# Patient Record
Sex: FEMALE | Race: WHITE | NOT HISPANIC OR LATINO
[De-identification: names, ages, dates, MRNs, and addresses within clinical notes are randomized per-mention and may not be internally consistent; named-entity substitution may affect disease eponyms.]

---

## 2017-07-16 PROBLEM — Z00.00 ENCOUNTER FOR PREVENTIVE HEALTH EXAMINATION: Status: ACTIVE | Noted: 2017-07-16

## 2017-08-14 ENCOUNTER — APPOINTMENT (OUTPATIENT)
Dept: SURGERY | Facility: CLINIC | Age: 52
End: 2017-08-14

## 2017-10-09 ENCOUNTER — APPOINTMENT (OUTPATIENT)
Dept: SURGERY | Facility: CLINIC | Age: 52
End: 2017-10-09

## 2018-03-19 ENCOUNTER — APPOINTMENT (OUTPATIENT)
Dept: SURGERY | Facility: CLINIC | Age: 53
End: 2018-03-19

## 2018-06-11 ENCOUNTER — APPOINTMENT (OUTPATIENT)
Dept: SURGERY | Facility: CLINIC | Age: 53
End: 2018-06-11

## 2019-01-30 ENCOUNTER — APPOINTMENT (OUTPATIENT)
Dept: SURGERY | Facility: CLINIC | Age: 54
End: 2019-01-30
Payer: COMMERCIAL

## 2019-01-30 VITALS
HEIGHT: 65 IN | DIASTOLIC BLOOD PRESSURE: 90 MMHG | HEART RATE: 77 BPM | TEMPERATURE: 97.6 F | WEIGHT: 270.6 LBS | SYSTOLIC BLOOD PRESSURE: 157 MMHG | BODY MASS INDEX: 45.08 KG/M2

## 2019-01-30 DIAGNOSIS — Z87.891 PERSONAL HISTORY OF NICOTINE DEPENDENCE: ICD-10-CM

## 2019-01-30 DIAGNOSIS — Z01.818 ENCOUNTER FOR OTHER PREPROCEDURAL EXAMINATION: ICD-10-CM

## 2019-01-30 DIAGNOSIS — Z78.9 OTHER SPECIFIED HEALTH STATUS: ICD-10-CM

## 2019-01-30 PROCEDURE — 99203 OFFICE O/P NEW LOW 30 MIN: CPT

## 2019-01-30 NOTE — ASSESSMENT
[FreeTextEntry1] : Ms. Vaca is a 54 year old female with morbid obesity and BMI 44 which places her in the morbidly obese category. This has directly contributed to her current medical conditions as well as, a decreased quality of life. She has very little chance of successfully losing and maintaining a significant amount of weight with non-surgical management, and would benefit from surgical intervention. She meets the criteria for weight loss surgery as defined in the NIH consensus statement, surgery is medically necessary. \par Given patient’s current BMI and obesity-related comorbidities, I feel the patient is a candidate for and would benefit from weight loss surgery, as all prior attempts by non-surgical means have been futile. \par Patient remains an excellent candidate for weight loss surgery, given her multiple obesity-related comorbidities, and is committed to proceeding with weight loss surgery. \par Treatment Plans \par I have had a lengthy conversation with the patient and have discussed my impressions and treatment plans with the patient. Informed consent and potential risks , benefits  of the planned surgery were discussed in depth, including but not limited to leak, sepsis, death, reflux, inability to lose desired amount of weight or regain of weight, need for other procedure or re-operation, among others. All surgical options were discussed including non-surgical treatments. The patient is interested in a sleeve gastrectomy for weight loss. \par The weight loss surgery education packet as well as the nutrition education packet have been reviewed and given to the patient, and I also provided patient with detailed information regarding the pre-operative requirements with respect to testing, medical, nutritional and psychological evaluations and documentation of same. \par I also discussed was importance of taking supplements and attending regular follow-up. I reviewed importance of behavioral modification and follow-up in order to optimize outcomes and avoid complications. The patient is aware of the expected length of stay and discharge plan for the sleeve gastrectomy procedure. I encouraged the patient to maintain a diet and exercise program to promote optimum health for the surgical procedure and post-op course. \par testing and evaluations and documentation received, this information to justify medical necessity for weight loss surgery will be submitted to insurance for pre-certification. \par The patient had the opportunity to ask pertinent questions, and all of patient’s questions and concerns were addressed to patient’s satisfaction, and patient verbalized an understanding of the information discussed. \par

## 2019-01-30 NOTE — PHYSICAL EXAM
[Obese] : obese [Obese, well nourished, in no acute distress] : obese, well nourished, in no acute distress [Normal] : affect appropriate

## 2019-01-30 NOTE — REVIEW OF SYSTEMS
[Wheezing] : no wheezing [Cough] : no cough [SOB on Exertion] : shortness of breath during exertion [Abdominal Pain] : no abdominal pain [Reflux/Heartburn] : no reflux/ heartburn [Negative] : Allergic/Immunologic

## 2019-01-30 NOTE — HISTORY OF PRESENT ILLNESS
[de-identified] : Ms. Vaca is a 54 year old female who presents for bariatric surgery consultation. Has a long-standing history of severe obesity refractory to multiple prior attempts at weight loss including conservative treatments of diet and exercise. Patient has also developed increased risk or worsening obesity-related co-morbidities, including sleep apnea. \par She has completed all of her required pre-operative evaluations and is ready to proceed with a sleeve gastrectomy.  \par She reports no interval changes to her medical history or overall health status.

## 2019-01-30 NOTE — PLAN
[FreeTextEntry1] : We will submit all completed testing and evaluations for insurance pre-certification for a sleeve gastrectomy.  She will also meet with our bariatric coordinator prior to surgery.  \par She will complete PST once surgery date has been scheduled. \par

## 2019-03-06 ENCOUNTER — APPOINTMENT (OUTPATIENT)
Dept: SURGERY | Facility: CLINIC | Age: 54
End: 2019-03-06
Payer: COMMERCIAL

## 2019-03-06 VITALS
HEART RATE: 89 BPM | HEIGHT: 65 IN | TEMPERATURE: 97.7 F | SYSTOLIC BLOOD PRESSURE: 124 MMHG | WEIGHT: 267 LBS | DIASTOLIC BLOOD PRESSURE: 84 MMHG | BODY MASS INDEX: 44.48 KG/M2

## 2019-03-06 DIAGNOSIS — Z82.49 FAMILY HISTORY OF ISCHEMIC HEART DISEASE AND OTHER DISEASES OF THE CIRCULATORY SYSTEM: ICD-10-CM

## 2019-03-06 PROCEDURE — 99214 OFFICE O/P EST MOD 30 MIN: CPT

## 2019-03-06 NOTE — CONSULT LETTER
[Dear  ___] : Dear  [unfilled], [Consult Letter:] : I had the pleasure of evaluating your patient, [unfilled]. [Please see my note below.] : Please see my note below. [Consult Closing:] : Thank you very much for allowing me to participate in the care of this patient.  If you have any questions, please do not hesitate to contact me. [FreeTextEntry3] : Martin

## 2019-03-06 NOTE — PHYSICAL EXAM
[Alert] : alert [Oriented to Person] : oriented to person [Oriented to Place] : oriented to place [Oriented to Time] : oriented to time [Calm] : calm [Respiratory Effort] : normal respiratory effort [de-identified] : The patient is alert, well-groomed, and cheerful. [de-identified] : NC/AT, PERRL, EOMI, sclera anicteric [de-identified] :  Neck supple. Trachea midline.   [de-identified] : Obese, protuberant. Soft, nontender, nondistended, no rebound or guarding.\par  [de-identified] : Ambulating without difficulty [de-identified] : No Jaundice

## 2019-03-06 NOTE — PLAN
[FreeTextEntry1] : I have had a lengthy and detailed conversation with the patient and have discussed my impressions and treatment plans with the patient. \par Informed consent and potential risks , benefits  and alternatives to the planned surgery were discussed in depth, including but not limited to:  bleeding, infection,  Leak; esophageal, gastric, hepatic or intestinal injury; reflux, inability to lose desired amount of weight or regain of weight, need for other procedure or re-operation, sepsis, death, among others. All surgical options were discussed including non-surgical treatments. The patient remains interested in a sleeve gastrectomy for weight loss. \par The weight loss surgery education packet as well as the nutrition education packet have been reviewed and given to the patient previously, and I also provided patient with detailed information regarding the post-operative instructions and expectations.  \par Also discussed was importance of taking supplements and attending regular follow-up. I reviewed importance of behavioral modification and follow-up in order to optimize outcomes and avoid complications. \par The patient is aware of the expected length of stay and discharge plan for the sleeve gastrectomy procedure. I encouraged the patient to maintain a diet and exercise program to promote optimum health for the surgical procedure and post-op course. \par I informed patient that all testing and evaluations and documentation have been received and reviewed.  This information to justify medical necessity for weight loss surgery will be submitted to insurance for pre-certification. \par \par We will plan to proceed with surgery on 3/18/19, pending any required insurance pre-certification or pre-approval.  \par Patient agrees to obtain any outstanding necessary pre-operative evaluations and clearances that may be required for pre-surgical optimization.  \par \par The patient had the opportunity to ask pertinent questions, and all of patient’s questions and concerns were addressed to patient’s satisfaction.  She verbalized an understanding of the information discussed, and wishes to proceed with surgery. Informed consent for laparoscopic sleeve gastrectomy reviewed with and signed by patient.\par

## 2019-03-06 NOTE — ASSESSMENT
[FreeTextEntry1] : Ms. Vaca is a 54 year old female who presents for bariatric surgery follow up. patient offers no complaints. patient reports no fever, chills,  or  pain. she reports occasional SOB on exertion. she states that she has difficulty using the CPAP machine but she tries her best.  she saw Dr Ortez (PMD)  and he will give her  clearance for surgery. patient is scheduled for sleeve gastrectomy on 03/18/2019.   Complications including dehydration, nausea/vomiting, gastroesophageal reflux, perforation or leak, bowel obstruction, bleeding, venous thromboembolism, vitamin deficiency, and need for reoperation were discussed. All the patient's questions were answered and the patient would like to proceed with surgical planning.  \par \par Given patient’s current BMI and obesity-related comorbidities, I feel the patient remains a candidate for and would benefit from weight loss surgery, as all prior attempts by non-surgical means have been futile, and given  {his/her (lowercase):98161 } multiple obesity-related comorbidities.  Patient has put a great deal of thought and consideration into weight loss surgery, and remains  committed to proceeding with a sleeve gastrectomy. \par

## 2019-03-06 NOTE — HISTORY OF PRESENT ILLNESS
[de-identified] : Ms. Vaca is a 54 year old female who presents for bariatric surgery follow up. patient offers no complaints. patient reports no fever, chills,  or  pain. she reports occasional SOB on exertion. Not in acute distress right now.  she states that she has difficulty using the CPAP machine but she tries her best.  she saw Dr Ortez (PMD)  and he will give her  clearance for surgery. patient is scheduled for  sleeve gastrectomy  on 03/18/2019.  [de-identified] : Ms. Vaca presents for review and discussion of informed consent in preparation for planned sleeve gastrectomy on 3/18/19 . \par \par She had previously attended our weight loss seminar, and has completed all of the required pre-operative testing and evaluations, and is ready to proceed with a sleeve gastrectomy. \par \par She is aware of the pre-op liquid diet for 2 weeks and will complete pre-testing and follow with a visit to PCP for medical clearance. \par \par She has a long-standing history of severe obesity refractory to multiple prior attempts at weight loss including conservative treatments of diet and exercise. In addition, She suffers from obesity-related co-morbidities.  \par \par Patient reports no interval changes to her overall health status or medical history, and has no complaints at this time. \par

## 2019-03-06 NOTE — REVIEW OF SYSTEMS
[Feeling Tired] : feeling tired [SOB on Exertion] : shortness of breath during exertion [Fever] : no fever [Chills] : no chills [Feeling Poorly] : not feeling poorly [Sore Throat] : no sore throat [Hoarseness] : no hoarseness [Chest Pain] : no chest pain [Palpitations] : no palpitations [Shortness Of Breath] : no shortness of breath [Wheezing] : no wheezing [Cough] : no cough [Abdominal Pain] : no abdominal pain [Vomiting] : no vomiting [Diarrhea] : no diarrhea [Heartburn] : no heartburn [Dysuria] : no dysuria [Arthralgias] : no arthralgias [Skin Lesions] : no skin lesions [Easy Bleeding] : no tendency for easy bleeding [Easy Bruising] : no tendency for easy bruising [Negative] : Gastrointestinal

## 2019-03-11 ENCOUNTER — OUTPATIENT (OUTPATIENT)
Dept: OUTPATIENT SERVICES | Facility: HOSPITAL | Age: 54
LOS: 1 days | End: 2019-03-11
Payer: COMMERCIAL

## 2019-03-11 VITALS
WEIGHT: 268.08 LBS | SYSTOLIC BLOOD PRESSURE: 124 MMHG | OXYGEN SATURATION: 96 % | HEIGHT: 65 IN | RESPIRATION RATE: 18 BRPM | DIASTOLIC BLOOD PRESSURE: 83 MMHG | TEMPERATURE: 97 F | HEART RATE: 80 BPM

## 2019-03-11 DIAGNOSIS — J45.909 UNSPECIFIED ASTHMA, UNCOMPLICATED: ICD-10-CM

## 2019-03-11 DIAGNOSIS — Z98.51 TUBAL LIGATION STATUS: Chronic | ICD-10-CM

## 2019-03-11 DIAGNOSIS — E66.01 MORBID (SEVERE) OBESITY DUE TO EXCESS CALORIES: ICD-10-CM

## 2019-03-11 DIAGNOSIS — G47.33 OBSTRUCTIVE SLEEP APNEA (ADULT) (PEDIATRIC): ICD-10-CM

## 2019-03-11 DIAGNOSIS — Z01.818 ENCOUNTER FOR OTHER PREPROCEDURAL EXAMINATION: ICD-10-CM

## 2019-03-11 DIAGNOSIS — Z98.890 OTHER SPECIFIED POSTPROCEDURAL STATES: Chronic | ICD-10-CM

## 2019-03-11 LAB
ALBUMIN SERPL ELPH-MCNC: 3.6 G/DL — SIGNIFICANT CHANGE UP (ref 3.5–5)
ALP SERPL-CCNC: 99 U/L — SIGNIFICANT CHANGE UP (ref 40–120)
ALT FLD-CCNC: 63 U/L DA — HIGH (ref 10–60)
ANION GAP SERPL CALC-SCNC: 6 MMOL/L — SIGNIFICANT CHANGE UP (ref 5–17)
APTT BLD: 35.6 SEC — SIGNIFICANT CHANGE UP (ref 27.5–36.3)
AST SERPL-CCNC: 32 U/L — SIGNIFICANT CHANGE UP (ref 10–40)
BILIRUB SERPL-MCNC: 0.5 MG/DL — SIGNIFICANT CHANGE UP (ref 0.2–1.2)
BUN SERPL-MCNC: 13 MG/DL — SIGNIFICANT CHANGE UP (ref 7–18)
CALCIUM SERPL-MCNC: 8.9 MG/DL — SIGNIFICANT CHANGE UP (ref 8.4–10.5)
CHLORIDE SERPL-SCNC: 106 MMOL/L — SIGNIFICANT CHANGE UP (ref 96–108)
CO2 SERPL-SCNC: 26 MMOL/L — SIGNIFICANT CHANGE UP (ref 22–31)
CREAT SERPL-MCNC: 0.76 MG/DL — SIGNIFICANT CHANGE UP (ref 0.5–1.3)
GLUCOSE SERPL-MCNC: 101 MG/DL — HIGH (ref 70–99)
HBA1C BLD-MCNC: 6 % — HIGH (ref 4–5.6)
HCT VFR BLD CALC: 46.6 % — HIGH (ref 34.5–45)
HGB BLD-MCNC: 15 G/DL — SIGNIFICANT CHANGE UP (ref 11.5–15.5)
INR BLD: 1.27 RATIO — HIGH (ref 0.88–1.16)
MCHC RBC-ENTMCNC: 26.9 PG — LOW (ref 27–34)
MCHC RBC-ENTMCNC: 32.2 GM/DL — SIGNIFICANT CHANGE UP (ref 32–36)
MCV RBC AUTO: 83.5 FL — SIGNIFICANT CHANGE UP (ref 80–100)
NRBC # BLD: 0 /100 WBCS — SIGNIFICANT CHANGE UP (ref 0–0)
PLATELET # BLD AUTO: 279 K/UL — SIGNIFICANT CHANGE UP (ref 150–400)
POTASSIUM SERPL-MCNC: 4 MMOL/L — SIGNIFICANT CHANGE UP (ref 3.5–5.3)
POTASSIUM SERPL-SCNC: 4 MMOL/L — SIGNIFICANT CHANGE UP (ref 3.5–5.3)
PROT SERPL-MCNC: 8.3 G/DL — SIGNIFICANT CHANGE UP (ref 6–8.3)
PROTHROM AB SERPL-ACNC: 14.2 SEC — HIGH (ref 10–12.9)
RBC # BLD: 5.58 M/UL — HIGH (ref 3.8–5.2)
RBC # FLD: 12.9 % — SIGNIFICANT CHANGE UP (ref 10.3–14.5)
SODIUM SERPL-SCNC: 138 MMOL/L — SIGNIFICANT CHANGE UP (ref 135–145)
WBC # BLD: 8.8 K/UL — SIGNIFICANT CHANGE UP (ref 3.8–10.5)
WBC # FLD AUTO: 8.8 K/UL — SIGNIFICANT CHANGE UP (ref 3.8–10.5)

## 2019-03-11 PROCEDURE — 85027 COMPLETE CBC AUTOMATED: CPT

## 2019-03-11 PROCEDURE — 83036 HEMOGLOBIN GLYCOSYLATED A1C: CPT

## 2019-03-11 PROCEDURE — G0463: CPT

## 2019-03-11 PROCEDURE — 85730 THROMBOPLASTIN TIME PARTIAL: CPT

## 2019-03-11 PROCEDURE — 86901 BLOOD TYPING SEROLOGIC RH(D): CPT

## 2019-03-11 PROCEDURE — 85610 PROTHROMBIN TIME: CPT

## 2019-03-11 PROCEDURE — 86850 RBC ANTIBODY SCREEN: CPT

## 2019-03-11 PROCEDURE — 36415 COLL VENOUS BLD VENIPUNCTURE: CPT

## 2019-03-11 PROCEDURE — 80053 COMPREHEN METABOLIC PANEL: CPT

## 2019-03-11 PROCEDURE — 86900 BLOOD TYPING SEROLOGIC ABO: CPT

## 2019-03-11 RX ORDER — SODIUM CHLORIDE 9 MG/ML
3 INJECTION INTRAMUSCULAR; INTRAVENOUS; SUBCUTANEOUS EVERY 8 HOURS
Qty: 0 | Refills: 0 | Status: DISCONTINUED | OUTPATIENT
Start: 2019-03-18 | End: 2019-03-20

## 2019-03-11 NOTE — H&P PST ADULT - PROBLEM SELECTOR PLAN 2
Periop and postop precautions for moderate DAVIS. Patient agrees to bring CPAP machine the day of surgery

## 2019-03-11 NOTE — H&P PST ADULT - GASTROINTESTINAL DETAILS
no masses palpable/no organomegaly/no guarding/soft/nontender/no bruit/no rebound tenderness/bowel sounds normal/no rigidity/no distention

## 2019-03-11 NOTE — H&P PST ADULT - FAMILY HISTORY
Father  Still living? No  Family history of acute myocardial infarction, Age at diagnosis: Age Unknown  Family history of heart failure, Age at diagnosis: Age Unknown     Mother  Still living? Yes, Estimated age: Age Unknown  Family history of essential hypertension, Age at diagnosis: Age Unknown

## 2019-03-11 NOTE — H&P PST ADULT - NEGATIVE GASTROINTESTINAL SYMPTOMS
no vomiting/no diarrhea/no change in bowel habits/no flatulence/no abdominal pain/no melena/no constipation/no nausea

## 2019-03-11 NOTE — H&P PST ADULT - PSH
H/O arthroscopy of right knee    History of bilateral tubal ligation    S/P tubal ligation H/O arthroscopy of right knee    S/P tubal ligation

## 2019-03-11 NOTE — H&P PST ADULT - HISTORY OF PRESENT ILLNESS
55 y/o old female with Asthma, uncomplicated, DAVIS on CPAP, right knee osteoarthritis, morbid obesity due to excess calories presents to [facility] for presurgical evaluation. She is scheduled for laparoscopic sleeve gastrectomy 3/18/2019

## 2019-03-11 NOTE — H&P PST ADULT - ACTIVITY
Unable to walk >1 block, climbs 2 FOS w/o SOB, but complains of severe right knee pain and bilateral hip pain

## 2019-03-11 NOTE — H&P PST ADULT - NEGATIVE CARDIOVASCULAR SYMPTOMS
no orthopnea/no peripheral edema/no claudication/no palpitations/no paroxysmal nocturnal dyspnea/no chest pain

## 2019-03-11 NOTE — H&P PST ADULT - RS GEN PE MLT RESP DETAILS PC
no intercostal retractions/no rales/no rhonchi/respirations non-labored/no wheezes/airway patent/no subcutaneous emphysema/good air movement/breath sounds equal/clear to auscultation bilaterally

## 2019-03-11 NOTE — H&P PST ADULT - ATTENDING COMMENTS
Patient seen and examined by me.   Patient reports no interval changes to her overall health status or medical history.   Patient here for a sleeve gastrectomy.   Risks, benefits and alternatives to surgery, including the option of no surgery, were reinforced with the patient.    Patient's questions and concerns addressed to her satisfaction, and patient verbalized an understanding of the information discussed.   Surgical consent signed.

## 2019-03-11 NOTE — H&P PST ADULT - ASSESSMENT
55 y/o old female with Asthma, uncomplicated, moderate DAVIS on CPAP, right knee osteoarthritis, morbid obesity due to excess calories scheduled for laparoscopic sleeve gastrectomy 3/18/2019

## 2019-03-11 NOTE — H&P PST ADULT - NEGATIVE GENERAL SYMPTOMS
no polyphagia/no polyuria/no polydipsia/no fever/no chills/no fatigue/no sweating/no anorexia/no weight gain/no malaise

## 2019-03-11 NOTE — H&P PST ADULT - PROBLEM SELECTOR PLAN 1
Scheduled for laparoscopic sleeve gastrectomy 3/18/2019. Preoperative instructions discussed and given to patient. Verbalized understanding of same

## 2019-03-17 ENCOUNTER — TRANSCRIPTION ENCOUNTER (OUTPATIENT)
Age: 54
End: 2019-03-17

## 2019-03-18 ENCOUNTER — INPATIENT (INPATIENT)
Facility: HOSPITAL | Age: 54
LOS: 1 days | Discharge: HOME | DRG: 621 | End: 2019-03-20
Attending: SURGERY | Admitting: SURGERY
Payer: COMMERCIAL

## 2019-03-18 ENCOUNTER — RESULT REVIEW (OUTPATIENT)
Age: 54
End: 2019-03-18

## 2019-03-18 VITALS
SYSTOLIC BLOOD PRESSURE: 135 MMHG | WEIGHT: 268.08 LBS | OXYGEN SATURATION: 95 % | DIASTOLIC BLOOD PRESSURE: 88 MMHG | HEART RATE: 92 BPM | RESPIRATION RATE: 17 BRPM | HEIGHT: 65 IN | TEMPERATURE: 98 F

## 2019-03-18 DIAGNOSIS — Z98.890 OTHER SPECIFIED POSTPROCEDURAL STATES: Chronic | ICD-10-CM

## 2019-03-18 DIAGNOSIS — Z98.51 TUBAL LIGATION STATUS: Chronic | ICD-10-CM

## 2019-03-18 DIAGNOSIS — E66.01 MORBID (SEVERE) OBESITY DUE TO EXCESS CALORIES: ICD-10-CM

## 2019-03-18 PROCEDURE — 43775 LAP SLEEVE GASTRECTOMY: CPT

## 2019-03-18 PROCEDURE — 43775 LAP SLEEVE GASTRECTOMY: CPT | Mod: AS

## 2019-03-18 RX ORDER — ALBUTEROL 90 UG/1
2 AEROSOL, METERED ORAL
Qty: 0 | Refills: 0 | COMMUNITY

## 2019-03-18 RX ORDER — FENTANYL CITRATE 50 UG/ML
25 INJECTION INTRAVENOUS
Qty: 0 | Refills: 0 | Status: DISCONTINUED | OUTPATIENT
Start: 2019-03-18 | End: 2019-03-18

## 2019-03-18 RX ORDER — SODIUM CHLORIDE 9 MG/ML
1000 INJECTION, SOLUTION INTRAVENOUS
Qty: 0 | Refills: 0 | Status: DISCONTINUED | OUTPATIENT
Start: 2019-03-18 | End: 2019-03-20

## 2019-03-18 RX ORDER — HYDROMORPHONE HYDROCHLORIDE 2 MG/ML
0.5 INJECTION INTRAMUSCULAR; INTRAVENOUS; SUBCUTANEOUS
Qty: 0 | Refills: 0 | Status: DISCONTINUED | OUTPATIENT
Start: 2019-03-18 | End: 2019-03-20

## 2019-03-18 RX ORDER — ACETAMINOPHEN 500 MG
1000 TABLET ORAL ONCE
Qty: 0 | Refills: 0 | Status: COMPLETED | OUTPATIENT
Start: 2019-03-18 | End: 2019-03-18

## 2019-03-18 RX ORDER — PANTOPRAZOLE SODIUM 20 MG/1
40 TABLET, DELAYED RELEASE ORAL EVERY 24 HOURS
Qty: 0 | Refills: 0 | Status: DISCONTINUED | OUTPATIENT
Start: 2019-03-18 | End: 2019-03-20

## 2019-03-18 RX ORDER — ONDANSETRON 8 MG/1
4 TABLET, FILM COATED ORAL EVERY 6 HOURS
Qty: 0 | Refills: 0 | Status: DISCONTINUED | OUTPATIENT
Start: 2019-03-18 | End: 2019-03-20

## 2019-03-18 RX ORDER — HYOSCYAMINE SULFATE 0.13 MG
0.12 TABLET ORAL EVERY 6 HOURS
Qty: 0 | Refills: 0 | Status: DISCONTINUED | OUTPATIENT
Start: 2019-03-18 | End: 2019-03-20

## 2019-03-18 RX ORDER — ENOXAPARIN SODIUM 100 MG/ML
40 INJECTION SUBCUTANEOUS ONCE
Qty: 0 | Refills: 0 | Status: COMPLETED | OUTPATIENT
Start: 2019-03-18 | End: 2019-03-18

## 2019-03-18 RX ORDER — METOCLOPRAMIDE HCL 10 MG
10 TABLET ORAL EVERY 6 HOURS
Qty: 0 | Refills: 0 | Status: DISCONTINUED | OUTPATIENT
Start: 2019-03-18 | End: 2019-03-20

## 2019-03-18 RX ORDER — ENOXAPARIN SODIUM 100 MG/ML
40 INJECTION SUBCUTANEOUS EVERY 12 HOURS
Qty: 0 | Refills: 0 | Status: DISCONTINUED | OUTPATIENT
Start: 2019-03-19 | End: 2019-03-20

## 2019-03-18 RX ORDER — SCOPALAMINE 1 MG/3D
1 PATCH, EXTENDED RELEASE TRANSDERMAL ONCE
Qty: 0 | Refills: 0 | Status: COMPLETED | OUTPATIENT
Start: 2019-03-18 | End: 2019-03-18

## 2019-03-18 RX ORDER — ALBUTEROL 90 UG/1
2 AEROSOL, METERED ORAL EVERY 6 HOURS
Qty: 0 | Refills: 0 | Status: DISCONTINUED | OUTPATIENT
Start: 2019-03-18 | End: 2019-03-20

## 2019-03-18 RX ADMIN — FENTANYL CITRATE 25 MICROGRAM(S): 50 INJECTION INTRAVENOUS at 12:25

## 2019-03-18 RX ADMIN — HYDROMORPHONE HYDROCHLORIDE 0.5 MILLIGRAM(S): 2 INJECTION INTRAMUSCULAR; INTRAVENOUS; SUBCUTANEOUS at 15:00

## 2019-03-18 RX ADMIN — ONDANSETRON 4 MILLIGRAM(S): 8 TABLET, FILM COATED ORAL at 21:34

## 2019-03-18 RX ADMIN — Medication 1000 MILLIGRAM(S): at 20:00

## 2019-03-18 RX ADMIN — HYDROMORPHONE HYDROCHLORIDE 0.5 MILLIGRAM(S): 2 INJECTION INTRAMUSCULAR; INTRAVENOUS; SUBCUTANEOUS at 19:39

## 2019-03-18 RX ADMIN — FENTANYL CITRATE 25 MICROGRAM(S): 50 INJECTION INTRAVENOUS at 11:51

## 2019-03-18 RX ADMIN — HYDROMORPHONE HYDROCHLORIDE 0.5 MILLIGRAM(S): 2 INJECTION INTRAMUSCULAR; INTRAVENOUS; SUBCUTANEOUS at 23:08

## 2019-03-18 RX ADMIN — Medication 400 MILLIGRAM(S): at 14:33

## 2019-03-18 RX ADMIN — Medication 1000 MILLIGRAM(S): at 22:00

## 2019-03-18 RX ADMIN — FENTANYL CITRATE 25 MICROGRAM(S): 50 INJECTION INTRAVENOUS at 11:36

## 2019-03-18 RX ADMIN — SODIUM CHLORIDE 3 MILLILITER(S): 9 INJECTION INTRAMUSCULAR; INTRAVENOUS; SUBCUTANEOUS at 21:30

## 2019-03-18 RX ADMIN — SCOPALAMINE 1 PATCH: 1 PATCH, EXTENDED RELEASE TRANSDERMAL at 21:33

## 2019-03-18 RX ADMIN — PANTOPRAZOLE SODIUM 40 MILLIGRAM(S): 20 TABLET, DELAYED RELEASE ORAL at 14:34

## 2019-03-18 RX ADMIN — ENOXAPARIN SODIUM 40 MILLIGRAM(S): 100 INJECTION SUBCUTANEOUS at 07:23

## 2019-03-18 RX ADMIN — FENTANYL CITRATE 25 MICROGRAM(S): 50 INJECTION INTRAVENOUS at 12:40

## 2019-03-18 RX ADMIN — Medication 400 MILLIGRAM(S): at 21:34

## 2019-03-18 RX ADMIN — FENTANYL CITRATE 25 MICROGRAM(S): 50 INJECTION INTRAVENOUS at 11:06

## 2019-03-18 RX ADMIN — FENTANYL CITRATE 25 MICROGRAM(S): 50 INJECTION INTRAVENOUS at 14:15

## 2019-03-18 RX ADMIN — HYDROMORPHONE HYDROCHLORIDE 0.5 MILLIGRAM(S): 2 INJECTION INTRAMUSCULAR; INTRAVENOUS; SUBCUTANEOUS at 20:01

## 2019-03-18 RX ADMIN — FENTANYL CITRATE 25 MICROGRAM(S): 50 INJECTION INTRAVENOUS at 11:21

## 2019-03-18 RX ADMIN — Medication 10 MILLIGRAM(S): at 17:37

## 2019-03-18 RX ADMIN — ONDANSETRON 4 MILLIGRAM(S): 8 TABLET, FILM COATED ORAL at 15:05

## 2019-03-18 RX ADMIN — HYDROMORPHONE HYDROCHLORIDE 0.5 MILLIGRAM(S): 2 INJECTION INTRAMUSCULAR; INTRAVENOUS; SUBCUTANEOUS at 23:29

## 2019-03-18 RX ADMIN — FENTANYL CITRATE 25 MICROGRAM(S): 50 INJECTION INTRAVENOUS at 13:37

## 2019-03-18 RX ADMIN — HYDROMORPHONE HYDROCHLORIDE 0.5 MILLIGRAM(S): 2 INJECTION INTRAMUSCULAR; INTRAVENOUS; SUBCUTANEOUS at 15:30

## 2019-03-18 RX ADMIN — SODIUM CHLORIDE 160 MILLILITER(S): 9 INJECTION, SOLUTION INTRAVENOUS at 11:49

## 2019-03-18 RX ADMIN — SODIUM CHLORIDE 3 MILLILITER(S): 9 INJECTION INTRAMUSCULAR; INTRAVENOUS; SUBCUTANEOUS at 07:08

## 2019-03-18 NOTE — PROGRESS NOTE ADULT - ASSESSMENT
53 y/o female s/p Sleeve Gastrectomy, Post-op Stable      1. Ice chips  2. DVT PPx  3. AM Labs  4. Out of bed  5. CPAP tonight  6. Incentive Spirometer  7. Pain meds as needed  8. Likely clears in AM

## 2019-03-18 NOTE — BRIEF OPERATIVE NOTE - OPERATION/FINDINGS
No hiatal hernia present.  Longitudinal sleeve gastrectomy formed over 40 Frnch ViSiGi with staple line reinforcement and negative leak test.

## 2019-03-19 ENCOUNTER — TRANSCRIPTION ENCOUNTER (OUTPATIENT)
Age: 54
End: 2019-03-19

## 2019-03-19 LAB
ANION GAP SERPL CALC-SCNC: 4 MMOL/L — LOW (ref 5–17)
BASOPHILS # BLD AUTO: 0.03 K/UL — SIGNIFICANT CHANGE UP (ref 0–0.2)
BASOPHILS NFR BLD AUTO: 0.3 % — SIGNIFICANT CHANGE UP (ref 0–2)
BUN SERPL-MCNC: 12 MG/DL — SIGNIFICANT CHANGE UP (ref 7–18)
CALCIUM SERPL-MCNC: 8 MG/DL — LOW (ref 8.4–10.5)
CHLORIDE SERPL-SCNC: 106 MMOL/L — SIGNIFICANT CHANGE UP (ref 96–108)
CO2 SERPL-SCNC: 29 MMOL/L — SIGNIFICANT CHANGE UP (ref 22–31)
CREAT SERPL-MCNC: 0.74 MG/DL — SIGNIFICANT CHANGE UP (ref 0.5–1.3)
EOSINOPHIL # BLD AUTO: 0.03 K/UL — SIGNIFICANT CHANGE UP (ref 0–0.5)
EOSINOPHIL NFR BLD AUTO: 0.3 % — SIGNIFICANT CHANGE UP (ref 0–6)
GLUCOSE SERPL-MCNC: 100 MG/DL — HIGH (ref 70–99)
HCT VFR BLD CALC: 41.5 % — SIGNIFICANT CHANGE UP (ref 34.5–45)
HGB BLD-MCNC: 13.1 G/DL — SIGNIFICANT CHANGE UP (ref 11.5–15.5)
IMM GRANULOCYTES NFR BLD AUTO: 0.4 % — SIGNIFICANT CHANGE UP (ref 0–1.5)
LYMPHOCYTES # BLD AUTO: 2.26 K/UL — SIGNIFICANT CHANGE UP (ref 1–3.3)
LYMPHOCYTES # BLD AUTO: 22.7 % — SIGNIFICANT CHANGE UP (ref 13–44)
MCHC RBC-ENTMCNC: 27 PG — SIGNIFICANT CHANGE UP (ref 27–34)
MCHC RBC-ENTMCNC: 31.6 GM/DL — LOW (ref 32–36)
MCV RBC AUTO: 85.4 FL — SIGNIFICANT CHANGE UP (ref 80–100)
MONOCYTES # BLD AUTO: 0.78 K/UL — SIGNIFICANT CHANGE UP (ref 0–0.9)
MONOCYTES NFR BLD AUTO: 7.8 % — SIGNIFICANT CHANGE UP (ref 2–14)
NEUTROPHILS # BLD AUTO: 6.81 K/UL — SIGNIFICANT CHANGE UP (ref 1.8–7.4)
NEUTROPHILS NFR BLD AUTO: 68.5 % — SIGNIFICANT CHANGE UP (ref 43–77)
NRBC # BLD: 0 /100 WBCS — SIGNIFICANT CHANGE UP (ref 0–0)
PLATELET # BLD AUTO: 265 K/UL — SIGNIFICANT CHANGE UP (ref 150–400)
POTASSIUM SERPL-MCNC: 4 MMOL/L — SIGNIFICANT CHANGE UP (ref 3.5–5.3)
POTASSIUM SERPL-SCNC: 4 MMOL/L — SIGNIFICANT CHANGE UP (ref 3.5–5.3)
RBC # BLD: 4.86 M/UL — SIGNIFICANT CHANGE UP (ref 3.8–5.2)
RBC # FLD: 13.1 % — SIGNIFICANT CHANGE UP (ref 10.3–14.5)
SODIUM SERPL-SCNC: 139 MMOL/L — SIGNIFICANT CHANGE UP (ref 135–145)
WBC # BLD: 9.95 K/UL — SIGNIFICANT CHANGE UP (ref 3.8–10.5)
WBC # FLD AUTO: 9.95 K/UL — SIGNIFICANT CHANGE UP (ref 3.8–10.5)

## 2019-03-19 RX ORDER — ONDANSETRON 8 MG/1
1 TABLET, FILM COATED ORAL
Qty: 90 | Refills: 0 | OUTPATIENT
Start: 2019-03-19 | End: 2019-04-17

## 2019-03-19 RX ORDER — HYOSCYAMINE SULFATE 0.13 MG
1 TABLET ORAL
Qty: 120 | Refills: 0 | OUTPATIENT
Start: 2019-03-19 | End: 2019-04-17

## 2019-03-19 RX ORDER — OMEPRAZOLE 10 MG/1
1 CAPSULE, DELAYED RELEASE ORAL
Qty: 30 | Refills: 0 | OUTPATIENT
Start: 2019-03-19 | End: 2019-04-17

## 2019-03-19 RX ORDER — MELOXICAM 15 MG/1
1 TABLET ORAL
Qty: 0 | Refills: 0 | COMMUNITY

## 2019-03-19 RX ORDER — KETOROLAC TROMETHAMINE 30 MG/ML
30 SYRINGE (ML) INJECTION ONCE
Qty: 0 | Refills: 0 | Status: DISCONTINUED | OUTPATIENT
Start: 2019-03-19 | End: 2019-03-19

## 2019-03-19 RX ORDER — CYCLOBENZAPRINE HYDROCHLORIDE 10 MG/1
1 TABLET, FILM COATED ORAL
Qty: 0 | Refills: 0 | COMMUNITY

## 2019-03-19 RX ADMIN — SODIUM CHLORIDE 3 MILLILITER(S): 9 INJECTION INTRAMUSCULAR; INTRAVENOUS; SUBCUTANEOUS at 21:12

## 2019-03-19 RX ADMIN — HYDROMORPHONE HYDROCHLORIDE 0.5 MILLIGRAM(S): 2 INJECTION INTRAMUSCULAR; INTRAVENOUS; SUBCUTANEOUS at 16:33

## 2019-03-19 RX ADMIN — ONDANSETRON 4 MILLIGRAM(S): 8 TABLET, FILM COATED ORAL at 21:49

## 2019-03-19 RX ADMIN — Medication 10 MILLIGRAM(S): at 18:36

## 2019-03-19 RX ADMIN — SODIUM CHLORIDE 160 MILLILITER(S): 9 INJECTION, SOLUTION INTRAVENOUS at 11:55

## 2019-03-19 RX ADMIN — Medication 10 MILLIGRAM(S): at 06:01

## 2019-03-19 RX ADMIN — HYDROMORPHONE HYDROCHLORIDE 0.5 MILLIGRAM(S): 2 INJECTION INTRAMUSCULAR; INTRAVENOUS; SUBCUTANEOUS at 10:36

## 2019-03-19 RX ADMIN — PANTOPRAZOLE SODIUM 40 MILLIGRAM(S): 20 TABLET, DELAYED RELEASE ORAL at 13:45

## 2019-03-19 RX ADMIN — Medication 10 MILLIGRAM(S): at 00:11

## 2019-03-19 RX ADMIN — HYDROMORPHONE HYDROCHLORIDE 0.5 MILLIGRAM(S): 2 INJECTION INTRAMUSCULAR; INTRAVENOUS; SUBCUTANEOUS at 06:31

## 2019-03-19 RX ADMIN — Medication 30 MILLIGRAM(S): at 22:16

## 2019-03-19 RX ADMIN — ENOXAPARIN SODIUM 40 MILLIGRAM(S): 100 INJECTION SUBCUTANEOUS at 19:02

## 2019-03-19 RX ADMIN — ONDANSETRON 4 MILLIGRAM(S): 8 TABLET, FILM COATED ORAL at 04:02

## 2019-03-19 RX ADMIN — SCOPALAMINE 1 PATCH: 1 PATCH, EXTENDED RELEASE TRANSDERMAL at 20:44

## 2019-03-19 RX ADMIN — HYDROMORPHONE HYDROCHLORIDE 0.5 MILLIGRAM(S): 2 INJECTION INTRAMUSCULAR; INTRAVENOUS; SUBCUTANEOUS at 20:44

## 2019-03-19 RX ADMIN — SCOPALAMINE 1 PATCH: 1 PATCH, EXTENDED RELEASE TRANSDERMAL at 06:10

## 2019-03-19 RX ADMIN — HYDROMORPHONE HYDROCHLORIDE 0.5 MILLIGRAM(S): 2 INJECTION INTRAMUSCULAR; INTRAVENOUS; SUBCUTANEOUS at 10:50

## 2019-03-19 RX ADMIN — HYDROMORPHONE HYDROCHLORIDE 0.5 MILLIGRAM(S): 2 INJECTION INTRAMUSCULAR; INTRAVENOUS; SUBCUTANEOUS at 06:15

## 2019-03-19 RX ADMIN — SODIUM CHLORIDE 3 MILLILITER(S): 9 INJECTION INTRAMUSCULAR; INTRAVENOUS; SUBCUTANEOUS at 06:10

## 2019-03-19 RX ADMIN — ENOXAPARIN SODIUM 40 MILLIGRAM(S): 100 INJECTION SUBCUTANEOUS at 06:01

## 2019-03-19 RX ADMIN — Medication 30 MILLIGRAM(S): at 21:52

## 2019-03-19 RX ADMIN — ONDANSETRON 4 MILLIGRAM(S): 8 TABLET, FILM COATED ORAL at 08:54

## 2019-03-19 RX ADMIN — SODIUM CHLORIDE 3 MILLILITER(S): 9 INJECTION INTRAMUSCULAR; INTRAVENOUS; SUBCUTANEOUS at 13:44

## 2019-03-19 RX ADMIN — ONDANSETRON 4 MILLIGRAM(S): 8 TABLET, FILM COATED ORAL at 16:21

## 2019-03-19 RX ADMIN — Medication 10 MILLIGRAM(S): at 11:49

## 2019-03-19 NOTE — DISCHARGE NOTE PROVIDER - NSDCCPCAREPLAN_GEN_ALL_CORE_FT
PRINCIPAL DISCHARGE DIAGNOSIS  Diagnosis: Morbid (severe) obesity due to excess calories  Assessment and Plan of Treatment: Weight loss   Clears to full iquid diet   No heavy liftging or straining   please follow up with Dr. Lagos within 1 week

## 2019-03-19 NOTE — DISCHARGE NOTE PROVIDER - CARE PROVIDER_API CALL
Martin Lagos)  Surgery  [id number] [address] [place] [address]  Phone: [phone]  Fax: [phone]  Follow Up Time: 1 week

## 2019-03-19 NOTE — CHART NOTE - NSCHARTNOTEFT_GEN_A_CORE
Upon Nutritional Assessment by the Registered Dietitian your patient was determined to meet criteria / has evidence of the following diagnosis/diagnoses:          [ ]  Mild Protein Calorie Malnutrition        [ ]  Moderate Protein Calorie Malnutrition        [ ] Severe Protein Calorie Malnutrition        [ ] Unspecified Protein Calorie Malnutrition        [ ] Underweight / BMI <19        [x ] Morbid Obesity / BMI > 40      Findings as based on:  •  Comprehensive nutrition assessment and consultation  •  Calorie counts (nutrient intake analysis)  •  Food acceptance and intake status from observations by staff  •  Follow up  •  Patient education  •  Intervention secondary to interdisciplinary rounds  •   concerns      Treatment:    The following diet has been recommended:  pt on bariatric clears    PROVIDER Section:     By signing this assessment you are acknowledging and agree with the diagnosis/diagnoses assigned by the Registered Dietitian    Comments:

## 2019-03-19 NOTE — DISCHARGE NOTE PROVIDER - HOSPITAL COURSE
55 y/o old female with Asthma, uncomplicated, DAVIS on CPAP, right knee osteoarthritis, morbid obesity due to excess calories now s/p laparoscopic sleeve gastrectomy 3/18/2019. Patient tolerated procedure well. Tolerated clears. Patient for d/c home with outpatient follow up

## 2019-03-19 NOTE — CHART NOTE - NSCHARTNOTEFT_GEN_A_CORE
Focus note:   Admit with morbid obesity (BMI 44.6). S/p sleeve gastrectomy 3/18. Pt seen and discussed with RN earlier in day.Discussed with RN. Pt has MD to monitor. RD available. Pt has discharge instruction sheet and Bariatric Surgery Nutrition Guidelines.In addition to info provided by MD Lagos. Pt has been sleeping most of time and taken little clears (bariatric). MD to monitor. RD available.

## 2019-03-20 ENCOUNTER — TRANSCRIPTION ENCOUNTER (OUTPATIENT)
Age: 54
End: 2019-03-20

## 2019-03-20 VITALS
OXYGEN SATURATION: 93 % | RESPIRATION RATE: 17 BRPM | TEMPERATURE: 98 F | DIASTOLIC BLOOD PRESSURE: 80 MMHG | SYSTOLIC BLOOD PRESSURE: 120 MMHG | HEART RATE: 83 BPM

## 2019-03-20 PROCEDURE — 36415 COLL VENOUS BLD VENIPUNCTURE: CPT

## 2019-03-20 PROCEDURE — 82962 GLUCOSE BLOOD TEST: CPT

## 2019-03-20 PROCEDURE — 85027 COMPLETE CBC AUTOMATED: CPT

## 2019-03-20 PROCEDURE — 86900 BLOOD TYPING SEROLOGIC ABO: CPT

## 2019-03-20 PROCEDURE — 86901 BLOOD TYPING SEROLOGIC RH(D): CPT

## 2019-03-20 PROCEDURE — 86850 RBC ANTIBODY SCREEN: CPT

## 2019-03-20 PROCEDURE — 80048 BASIC METABOLIC PNL TOTAL CA: CPT

## 2019-03-20 RX ADMIN — Medication 10 MILLIGRAM(S): at 06:32

## 2019-03-20 RX ADMIN — Medication 10 MILLIGRAM(S): at 00:30

## 2019-03-20 RX ADMIN — HYDROMORPHONE HYDROCHLORIDE 0.5 MILLIGRAM(S): 2 INJECTION INTRAMUSCULAR; INTRAVENOUS; SUBCUTANEOUS at 01:14

## 2019-03-20 RX ADMIN — ENOXAPARIN SODIUM 40 MILLIGRAM(S): 100 INJECTION SUBCUTANEOUS at 06:32

## 2019-03-20 RX ADMIN — SODIUM CHLORIDE 3 MILLILITER(S): 9 INJECTION INTRAMUSCULAR; INTRAVENOUS; SUBCUTANEOUS at 05:45

## 2019-03-20 RX ADMIN — HYDROMORPHONE HYDROCHLORIDE 0.5 MILLIGRAM(S): 2 INJECTION INTRAMUSCULAR; INTRAVENOUS; SUBCUTANEOUS at 01:30

## 2019-03-20 RX ADMIN — ONDANSETRON 4 MILLIGRAM(S): 8 TABLET, FILM COATED ORAL at 03:46

## 2019-03-20 RX ADMIN — SCOPALAMINE 1 PATCH: 1 PATCH, EXTENDED RELEASE TRANSDERMAL at 06:32

## 2019-03-20 NOTE — DISCHARGE NOTE NURSING/CASE MANAGEMENT/SOCIAL WORK - NSDCDPATPORTLINK_GEN_ALL_CORE
You can access the [facility] Patient Portal, offered by [facility], by registering with the following website: [email or web address]

## 2019-03-20 NOTE — PROGRESS NOTE ADULT - SUBJECTIVE AND OBJECTIVE BOX
Patient seen and examined by me, and discussed with the surgical team.    Subjective:  Patient reports some incisional pain but no abdominal pain, nausea or emesis, and pain are controlled. She has been able to tolerate ice chps.  She denies chest pain, shortness of breath and has been able to ambulate and void without difficulty.       Objective:  She appears comfortable, in no distress, and is sitting up in bed .   HR 72, regular.    Abdomen soft, non-tender, non-distended.  Dressings intact with some sanguineous staining, but otherwise clean and dry.  Incisions without erythema.    Lungs clear and respiratory effort non-labored.  Proper use of incentive spirometer confirmed.      Assessment/Plan:  Patient doing well POD # 1s/p sleeve gastrectomy.  She has no nausea/emesis, vitals are stable and normal, pain is well controlled.     Patient may start with bariatric clear liquids as per protocol since she is without nausea and is hemodynamically stable.      If able to tolerate clear liquids, remains clinically stable, and has no pain, patient may be discharged to home.    Post-op manual given to and reviewed with patient, including post-operative diet/progression, activity, wound care, precautions and follow-up instructions.      All of patient's questions and concerns addressed to her satisfaction and patient verbalized an understanding of the information discussed.
Patient seen and examined by me, and discussed with the surgical team.   No events overnight.     Patient reports feeling well and better overall, and has no specific complaints at this time.  Patient has no abdominal pain, nausea or emesis, and pain is controlled.  Patient has been ambulating and tolerating liquids as per protocol without difficulty.  Patient denies abdominal pain, chest pain or shortness of breath.       Upon physical exam, patient appeared comfortable, HR 70, abdomen soft, non-tender, non-distended.  Incisional dressing are intact and incisions are dry and without erythema.      Post-op manual was previously given to and reviewed with patient, including post-operative diet and diet progression, activity and restrictions, wound care, precautions and follow-up instructions.     As patient is able to tolerate clear liquids, remains clinically stable, and has no complaints of pain, patient is clinically appropriate for discharge.   Post-operative diet, follow-up, restrictions and precautions again reinforced.  Patient will be seeing me in follow up on 3/27/19.     All of patient's questions and concerns addressed to patient’s satisfaction and patient verbalized an understanding of the information discussed.
s/p Sleeve Gastrectomy       Patient examined at bedside, having some pain   No nausea, no vomiting  Tolerating ice chips  Has not been out of bed yet nor has voided yet        T(F): 97.5 (03-18-19 @ 16:13), Max: 98 (03-18-19 @ 06:54)  HR: 75 (03-18-19 @ 16:13) (67 - 92)  BP: 143/79 (03-18-19 @ 16:13) (111/81 - 151/101)  RR: 20 (03-18-19 @ 16:13) (13 - 20)  SpO2: 96% (03-18-19 @ 16:13) (89% - 100%)  Wt(kg): --      03-18 @ 07:01  -  03-18 @ 18:16  --------------------------------------------------------  IN:    IV PiggyBack: 100 mL    lactated ringers.: 920 mL  Total IN: 1020 mL    OUT:    Estimated Blood Loss: 10 mL    Voided: 250 mL  Total OUT: 260 mL    Total NET: 760 mL        Physical Exam  General: AAOx3, No acute distress  Skin: No jaundice, no icterus  Abdomen: soft, mild incisional & epigastric tenderness,, nondistended, no rebound tenderness, no guarding, no palpable masses, steri strips intact  Extremities: non edematous, no calf pain bilaterally
s/p sleeve gastrectomy POD # 2. Patient examined at bedside, no complaints.   No nausea, no vomiting  Tolerating clear diet wants to go home     T(F): 98 (03-20-19 @ 04:56), Max: 98.7 (03-19-19 @ 21:47)  HR: 83 (03-20-19 @ 04:56) (71 - 94)  BP: 120/80 (03-20-19 @ 04:56) (101/46 - 122/67)  RR: 17 (03-20-19 @ 04:56) (17 - 18)  SpO2: 93% (03-20-19 @ 04:56) (92% - 97%)  Wt(kg): --      03-19 @ 07:01  -  03-20 @ 07:00  --------------------------------------------------------  IN:  Total IN: 0 mL    OUT:    Voided: 1700 mL  Total OUT: 1700 mL    Total NET: -1700 mL        Physical Exam  General: AAOx3, No acute distress  Skin: No jaundice, no icterus  Abdomen: soft, nontender, nondistended, no rebound tenderness, no guarding, no palpable masses  : Normal external genitalia  Extremities: non edematous, no calf pain bilaterally

## 2019-03-21 NOTE — CHART NOTE - NSCHARTNOTEFT_GEN_A_CORE
Pt discharged 3/20 AM. Pt received nutrition bariatric handouts 3/19, but was too sleepy and uncomfortable to entertain visit from RD. WAs to return 3/20, but pt discharged. Received call from MD to monitor. RD available. Demond this AM, requesting I call pt [phone], as she had some questions. Called and spoke to pt, answering her questions and providing full discussion as if she was inpatient including review of  diet protocols, emphasis on protein and fluid intake. Discussed and exercise, support groups, sleep and  follow-up with RD.

## 2019-03-27 ENCOUNTER — APPOINTMENT (OUTPATIENT)
Dept: SURGERY | Facility: CLINIC | Age: 54
End: 2019-03-27
Payer: COMMERCIAL

## 2019-03-27 VITALS
DIASTOLIC BLOOD PRESSURE: 84 MMHG | TEMPERATURE: 97.9 F | BODY MASS INDEX: 42.99 KG/M2 | SYSTOLIC BLOOD PRESSURE: 132 MMHG | WEIGHT: 258 LBS | HEART RATE: 79 BPM | HEIGHT: 65 IN

## 2019-03-27 PROCEDURE — 99024 POSTOP FOLLOW-UP VISIT: CPT

## 2019-03-27 NOTE — DATA REVIEWED
[FreeTextEntry1] : REDDY IVEY                      \par Surgical Final Report \par Final Diagnosis\par Partial stomach, spleen gastrectomy: Portion of stomach wall with\par gastric oxyntic type mucosa, with mild edema\par Negative for H. pylori on special stains\par \par Verified by: Candace Arellano M.D.\par (Electronic Signature)\par Reported on: 03/21/19 12:58 EDT, [address] [place] [address], [place],\par [facility]\par _________________________________________________________________\par \par Clinical History\par Morbid obesity\par Laparoscopic sleeve gastrectomy

## 2019-03-27 NOTE — HISTORY OF PRESENT ILLNESS
[de-identified] : Patient is s/p Laparoscopic sleeve gastrectomy on 03/18/2019. today patient offers no complaints. patient reports no fever, chills,  or  pain.  Surgical wounds are  healing well. No signs of inflammation, infection or exudate.  patient reports 17 lbs  weight loss.  patient states she is following dietary recommendations and has started taking  her vitamins.   she reports no nausea, vomiting or SOB. Patient reports good bowel movements.\par

## 2019-03-27 NOTE — PHYSICAL EXAM
[Alert] : alert [Oriented to Person] : oriented to person [Oriented to Place] : oriented to place [Oriented to Time] : oriented to time [Calm] : calm [Abdominal Masses] : No abdominal masses [Abdomen Tenderness] : ~T ~M No abdominal tenderness [de-identified] : The patient is alert, well-groomed, and cheerful. [de-identified] :  Neck supple. Trachea midline.   [de-identified] : Surgical wounds are  healing well. No signs of inflammation, infection or exudate.

## 2019-03-27 NOTE — ASSESSMENT
[FreeTextEntry1] : Patient is s/p Laparoscopic sleeve gastrectomy on 03/18/2019. today patient offers no complaints. patient reports no fever, chills,  or  pain.  Surgical wounds are  healing well. No signs of inflammation, infection or exudate.  patient reports 17 lbs  weight loss.  patient states she is following dietary recommendations and has started taking  her vitamins.   she reports no nausea, vomiting or SOB. Patient reports good bowel movements.\par Dietary recommendations reinforced. Activity recommendations were made. all questions answered

## 2019-03-27 NOTE — PLAN
[FreeTextEntry1] : patient will follow up in one month or if needed. Warning signs, follow up, and restrictions were discussed with the patient.

## 2019-04-17 ENCOUNTER — APPOINTMENT (OUTPATIENT)
Dept: SURGERY | Facility: CLINIC | Age: 54
End: 2019-04-17
Payer: COMMERCIAL

## 2019-04-17 VITALS
WEIGHT: 249.4 LBS | HEART RATE: 77 BPM | SYSTOLIC BLOOD PRESSURE: 110 MMHG | TEMPERATURE: 97.4 F | HEIGHT: 65 IN | DIASTOLIC BLOOD PRESSURE: 74 MMHG | BODY MASS INDEX: 41.55 KG/M2

## 2019-04-17 PROBLEM — G47.33 OBSTRUCTIVE SLEEP APNEA (ADULT) (PEDIATRIC): Chronic | Status: ACTIVE | Noted: 2019-03-11

## 2019-04-17 PROBLEM — M17.11 UNILATERAL PRIMARY OSTEOARTHRITIS, RIGHT KNEE: Chronic | Status: ACTIVE | Noted: 2019-03-11

## 2019-04-17 PROBLEM — E66.01 MORBID (SEVERE) OBESITY DUE TO EXCESS CALORIES: Chronic | Status: ACTIVE | Noted: 2019-03-11

## 2019-04-17 PROCEDURE — 99024 POSTOP FOLLOW-UP VISIT: CPT

## 2019-04-17 NOTE — HISTORY OF PRESENT ILLNESS
[Procedure: ___] : Procedure performed: [unfilled]  [Date of Surgery: ___] : Date of Surgery:   [unfilled] [Surgeon Name:   ___] : Surgeon Name: Dr. UNGER [Pre-Op Weight ___] : Pre-op weight was [unfilled] lbs [___ Months Post Op] : [unfilled] months [Walking] : walking [FreeTextEntry1] : able to eat solid foods  [Sleep Apnea] : no Sleep Apnea [GERD] : no GERD [de-identified] : Patient is s/p Laparoscopic sleeve gastrectomy on 03/18/2019.

## 2019-04-17 NOTE — HISTORY OF PRESENT ILLNESS
[Procedure: ___] : Procedure performed: [unfilled]  [Surgeon Name:   ___] : Surgeon Name: Dr. UNGER [Date of Surgery: ___] : Date of Surgery:   [unfilled] [Pre-Op Weight ___] : Pre-op weight was [unfilled] lbs [___ Months Post Op] : [unfilled] months [Walking] : walking [FreeTextEntry1] : able to eat solid foods  [GERD] : no GERD [Sleep Apnea] : no Sleep Apnea [de-identified] : Patient is s/p Laparoscopic sleeve gastrectomy on 03/18/2019.

## 2019-04-17 NOTE — REASON FOR VISIT
[Gastric Sleeve] : gastric sleeve [de-identified] : patient offers no complaints. patient reports no fever, chills,  or  pain.  Surgical wounds are  healing well. No signs of inflammation, infection or exudate. patient reports  [de-identified] : 03/18/2019

## 2019-04-17 NOTE — PHYSICAL EXAM
[de-identified] : Thorax symmetric with good excursion. Lungs resonant. Breath sounds vesicular with no added sounds. [de-identified] : anicteric [de-identified] : No signs of  dyspnea, orthopnea. Good S1 and  S2 [de-identified] :  Wounds healing well.   no signs of  inflammation or infection. slightly indurated   [de-identified] : soft, non-tender [de-identified] : large  [de-identified] : none

## 2019-04-17 NOTE — HISTORY OF PRESENT ILLNESS
[Procedure: ___] : Procedure performed: [unfilled]  [Surgeon Name:   ___] : Surgeon Name: Dr. UNGER [Date of Surgery: ___] : Date of Surgery:   [unfilled] [Pre-Op Weight ___] : Pre-op weight was [unfilled] lbs [___ Months Post Op] : [unfilled] months [Walking] : walking [FreeTextEntry1] : able to eat solid foods  [Sleep Apnea] : no Sleep Apnea [GERD] : no GERD [de-identified] : Patient is s/p Laparoscopic sleeve gastrectomy on 03/18/2019.

## 2019-04-17 NOTE — PHYSICAL EXAM
[de-identified] : anicteric [de-identified] : Thorax symmetric with good excursion. Lungs resonant. Breath sounds vesicular with no added sounds. [de-identified] : No signs of  dyspnea, orthopnea. Good S1 and  S2 [de-identified] :  Wounds healing well.   no signs of  inflammation or infection. slightly indurated   [de-identified] : large  [de-identified] : soft, non-tender [de-identified] : none

## 2019-04-17 NOTE — REASON FOR VISIT
[Gastric Sleeve] : gastric sleeve [de-identified] : patient offers no complaints. patient reports no fever, chills,  or  pain.  Surgical wounds are  healing well. No signs of inflammation, infection or exudate. patient reports  [de-identified] : 03/18/2019

## 2019-04-17 NOTE — REASON FOR VISIT
[Gastric Sleeve] : gastric sleeve [de-identified] : 03/18/2019 [de-identified] : patient offers no complaints. patient reports no fever, chills,  or  pain.  Surgical wounds are  healing well. No signs of inflammation, infection or exudate. patient reports

## 2019-04-17 NOTE — ASSESSMENT
[___ Months Post Op] : [unfilled] months [Today's Weight: ___] : Today's weight:[unfilled] lbs [Today's BMI: ___] : Today's BMI: [unfilled] [de-identified] :   I reviewed importance of behavioral modification and follow-up in order to optimize outcomes and avoid complications. Post-operative nutrition again reviewed and reinforced, including the importance of taking supplements, making healthy food choices. The importance of maintaining regular exercise/physical activity to maximize progress also reinforced. Recommend patient to see nutritionist and attend support groups.Patient's questions and concerns addressed to patient's satisfaction.

## 2019-04-17 NOTE — ASSESSMENT
[___ Months Post Op] : [unfilled] months [Today's Weight: ___] : Today's weight:[unfilled] lbs [Today's BMI: ___] : Today's BMI: [unfilled] [de-identified] :   I reviewed importance of behavioral modification and follow-up in order to optimize outcomes and avoid complications. Post-operative nutrition again reviewed and reinforced, including the importance of taking supplements, making healthy food choices. The importance of maintaining regular exercise/physical activity to maximize progress also reinforced. Recommend patient to see nutritionist and attend support groups.Patient's questions and concerns addressed to patient's satisfaction.

## 2019-04-17 NOTE — PHYSICAL EXAM
[de-identified] : Thorax symmetric with good excursion. Lungs resonant. Breath sounds vesicular with no added sounds. [de-identified] : anicteric [de-identified] : No signs of  dyspnea, orthopnea. Good S1 and  S2 [de-identified] : large  [de-identified] :  Wounds healing well.   no signs of  inflammation or infection. slightly indurated   [de-identified] : soft, non-tender [de-identified] : none

## 2019-04-17 NOTE — ASSESSMENT
[___ Months Post Op] : [unfilled] months [Today's Weight: ___] : Today's weight:[unfilled] lbs [Today's BMI: ___] : Today's BMI: [unfilled] [de-identified] :   I reviewed importance of behavioral modification and follow-up in order to optimize outcomes and avoid complications. Post-operative nutrition again reviewed and reinforced, including the importance of taking supplements, making healthy food choices. The importance of maintaining regular exercise/physical activity to maximize progress also reinforced. Recommend patient to see nutritionist and attend support groups.Patient's questions and concerns addressed to patient's satisfaction.

## 2019-04-24 ENCOUNTER — OTHER (OUTPATIENT)
Age: 54
End: 2019-04-24

## 2019-05-22 ENCOUNTER — APPOINTMENT (OUTPATIENT)
Dept: SURGERY | Facility: CLINIC | Age: 54
End: 2019-05-22
Payer: COMMERCIAL

## 2019-05-29 ENCOUNTER — APPOINTMENT (OUTPATIENT)
Dept: SURGERY | Facility: CLINIC | Age: 54
End: 2019-05-29
Payer: COMMERCIAL

## 2019-05-29 VITALS
BODY MASS INDEX: 39.82 KG/M2 | DIASTOLIC BLOOD PRESSURE: 86 MMHG | SYSTOLIC BLOOD PRESSURE: 123 MMHG | HEIGHT: 65 IN | WEIGHT: 239 LBS | HEART RATE: 66 BPM | TEMPERATURE: 97.5 F

## 2019-05-29 PROCEDURE — 99024 POSTOP FOLLOW-UP VISIT: CPT

## 2019-05-29 NOTE — CONSULT LETTER
[Dear  ___] : Dear  [unfilled], [Consult Letter:] : I had the pleasure of evaluating your patient, [unfilled]. [Consult Closing:] : Thank you very much for allowing me to participate in the care of this patient.  If you have any questions, please do not hesitate to contact me. [Please see my note below.] : Please see my note below. [Sincerely,] : Sincerely, [FreeTextEntry3] : Martin

## 2019-05-29 NOTE — ASSESSMENT
[FreeTextEntry1] : Patient with history of severe obesity s/p sleeve gastrectomy.\par Patient is doing well since the prior visit, with excellent interval and overall  progress.  Patient is tolerating an appropriate portion controlled and nutritionally balanced diet without difficulty.  \par \par  I reviewed importance of behavioral modification and follow-up in order to optimize outcomes and avoid complications. Post-operative nutrition again reviewed again  and reinforced, including the importance of taking supplements, making healthy food choices. The importance of maintaining regular exercise/physical activity to maximize progress also reinforced. Recommend patient to see nutritionist and attend support groups. Patient's questions and concerns addressed to patient's satisfaction.

## 2019-05-29 NOTE — PLAN
[FreeTextEntry1] : I reviewed importance of behavioral modification and follow-up in order to optimize outcomes and avoid complications. Post-operative nutrition again reviewed and reinforced, including the importance of taking supplements, making healthy food choices.  \par The importance of maintaining regular exercise/physical activity to maximize progress also reinforced. \par \par I will see her again in one month, and she will have a full set of labs (rx given) prior to seeing me. \par \par Patient's questions and concerns addressed to patient's satisfaction. \par \par

## 2019-05-29 NOTE — PHYSICAL EXAM
[Obese, well nourished, in no acute distress] : obese, well nourished, in no acute distress [Normal] : affect appropriate [de-identified] : Soft, nontender, nondistended. No masses, rebound or guarding. Incisions well-approximated, without erythema or drainage.  No hernias palpable.

## 2019-05-29 NOTE — HISTORY OF PRESENT ILLNESS
[de-identified] : Patient is s/p Laparoscopic sleeve gastrectomy on 03/18/2019.  Today patient offers no complaints.   Surgical wounds healed  well. Patient reports good bowel movements and appetite. patient reports exercising regularly. she lost 34 lbs since her surgery.  She states she feels excellent and that her SOB has greatly improved and that she is able to tolerate all types of food. she is taking her vitamins and drinks a lot of water. \par She is engaging in regular physical activity.   She reports rapid and appropriate satiety.  \par She offers no specific complaints today.  \par   [de-identified] : Patient reports no interval changes to her overall health status or medical history

## 2019-05-29 NOTE — REVIEW OF SYSTEMS
[Negative] : Allergic/Immunologic [Fever] : no fever [Fatigue] : no fatigue [Chills] : no chills [Dysphagia] : no dysphagia [Chest Pain] : no chest pain [Shortness Of Breath] : no shortness of breath [Palpitations] : no palpitations [Abdominal Pain] : no abdominal pain [Cough] : no cough [Vomiting] : no vomiting [Constipation] : no constipation [Reflux/Heartburn] : no reflux/ heartburn [Dysuria] : no dysuria [Joint Pain] : no joint pain

## 2019-07-10 ENCOUNTER — APPOINTMENT (OUTPATIENT)
Dept: SURGERY | Facility: CLINIC | Age: 54
End: 2019-07-10
Payer: COMMERCIAL

## 2019-07-10 VITALS
SYSTOLIC BLOOD PRESSURE: 112 MMHG | HEART RATE: 61 BPM | TEMPERATURE: 97.8 F | BODY MASS INDEX: 37.99 KG/M2 | HEIGHT: 65 IN | WEIGHT: 228 LBS | DIASTOLIC BLOOD PRESSURE: 77 MMHG

## 2019-07-10 PROCEDURE — 99213 OFFICE O/P EST LOW 20 MIN: CPT

## 2019-07-10 NOTE — PLAN
[FreeTextEntry1] : I reviewed importance of behavioral modification and follow-up in order to optimize outcomes and avoid complications. Post-operative nutrition again reviewed and reinforced, including the importance of taking supplements, making healthy food choices.  \par The importance of maintaining regular exercise/physical activity to maximize progress also reinforced. \par \par Recommend patient to see nutritionist and attend support groups.\par \par Patient's questions and concerns addressed to patient's satisfaction. \par \par \par Obtain blood work results from [facility]\par Biotin and Zinc for hair lose\par patient will follow up in 3 months or if needed.

## 2019-07-10 NOTE — ASSESSMENT
[FreeTextEntry1] : Patient with history of severe obesity s/p sleeve gastrectomy . \par \par Patient is doing well since the prior visit, with excellent interval and overall  progress.  Patient is tolerating an appropriate portion controlled and nutritionally balanced diet without difficulty.  \par \par

## 2019-07-10 NOTE — HISTORY OF PRESENT ILLNESS
[Procedure: ___] : Procedure performed: [unfilled]  [Date of Surgery: ___] : Date of Surgery:   [unfilled] [Surgeon Name:   ___] : Surgeon Name: Dr. UNGER [Pre-Op Weight ___] : Pre-op weight was [unfilled] lbs [___ Months Post Op] : [unfilled] months [de-identified] : Patient is s/p Laparoscopic sleeve gastrectomy on 03/18/2019.  Today patient offers no complaints.   Surgical wounds healed  well. Patient reports good bowel movements and appetite. patient reports exercising regularly.  her weight today is 228 lbs. she reports some hair lose but otherwise  she feels excellent and very happy with the outcome of the procedure. she is taking her vitamins and drinks a lot of water.  she had blood work done 2 days ago  at her [facility] office and does not know the results yet. \par \par She reports no reflux, emesis, and does report early satiety.  She has been mindful of her intake and is exercising regularly. \par

## 2019-07-10 NOTE — PHYSICAL EXAM
[Obese, well nourished, in no acute distress] : obese, well nourished, in no acute distress [Normal] : affect appropriate [de-identified] : Soft, nontender, nondistended. No masses, rebound or guarding. Incisions well-healed.  No hernias palpable.

## 2019-10-16 ENCOUNTER — APPOINTMENT (OUTPATIENT)
Dept: SURGERY | Facility: CLINIC | Age: 54
End: 2019-10-16
Payer: COMMERCIAL

## 2019-10-16 VITALS
TEMPERATURE: 98 F | DIASTOLIC BLOOD PRESSURE: 87 MMHG | BODY MASS INDEX: 35.82 KG/M2 | HEART RATE: 70 BPM | HEIGHT: 65 IN | WEIGHT: 215 LBS | SYSTOLIC BLOOD PRESSURE: 125 MMHG

## 2019-10-16 PROCEDURE — 99213 OFFICE O/P EST LOW 20 MIN: CPT

## 2019-10-16 NOTE — PHYSICAL EXAM
[Obese, well nourished, in no acute distress] : obese, well nourished, in no acute distress [Normal] : affect appropriate [de-identified] : Soft, nontender, nondistended. No masses, rebound or guarding. Incisions well-healed.  No hernias palpable.

## 2019-10-16 NOTE — ASSESSMENT
[FreeTextEntry1] : Patient is s/p Laparoscopic sleeve gastrectomy on 03/18/2019. she is doing very well.

## 2019-10-16 NOTE — PLAN
[FreeTextEntry1] :  I reviewed importance of behavioral modification and follow-up in order to optimize outcomes and avoid complications.  nutrition again reviewed and reinforced, including the importance of taking supplements, making healthy food choices. The importance of maintaining regular exercise/physical activity to maximize progress also reinforced. Recommend patient to see nutritionist and attend support groups. Patient's questions and concerns addressed to patient's satisfaction. \par

## 2019-10-16 NOTE — HISTORY OF PRESENT ILLNESS
[Procedure: ___] : Procedure performed: [unfilled]  [Date of Surgery: ___] : Date of Surgery:   [unfilled] [Surgeon Name:   ___] : Surgeon Name: Dr. UNGER [Pre-Op Weight ___] : Pre-op weight was [unfilled] lbs [___ Months Post Op] : [unfilled] months [de-identified] : Patient is s/p Laparoscopic sleeve gastrectomy on 03/18/2019.  Today patient offers no complaints.   Patient reports good bowel movements and appetite.  her weight today is 215 lbs.    she feels excellent and very happy with the outcome of the procedure.   she had blood work done  at her [facility] office and they were all normal accept low Vitamin D. she is taking her vitamins and drinks a lot of water.  Patient states that she  is making good food choices, working on eating smaller portions and becoming more mindful.  Patient has made a concerted effort to eat more balanced and nutritionally sound meals, and to engage in some level of physical activity on a regular basis.  \par

## 2020-01-22 ENCOUNTER — APPOINTMENT (OUTPATIENT)
Dept: SURGERY | Facility: CLINIC | Age: 55
End: 2020-01-22
Payer: COMMERCIAL

## 2020-01-22 VITALS
HEART RATE: 60 BPM | SYSTOLIC BLOOD PRESSURE: 128 MMHG | HEIGHT: 65 IN | WEIGHT: 218 LBS | BODY MASS INDEX: 36.32 KG/M2 | TEMPERATURE: 98 F | DIASTOLIC BLOOD PRESSURE: 82 MMHG

## 2020-01-22 PROCEDURE — 99213 OFFICE O/P EST LOW 20 MIN: CPT

## 2020-01-22 RX ORDER — CIPROFLOXACIN HYDROCHLORIDE 750 MG/1
TABLET, FILM COATED ORAL
Refills: 0 | Status: DISCONTINUED | COMMUNITY
End: 2020-01-22

## 2020-01-22 RX ORDER — IBUPROFEN 800 MG
TABLET ORAL
Refills: 0 | Status: DISCONTINUED | COMMUNITY
End: 2020-01-22

## 2020-01-22 NOTE — REVIEW OF SYSTEMS
[Negative] : Psychiatric [Fever] : no fever [Chills] : no chills [Dysphagia] : no dysphagia [Chest Pain] : no chest pain [Palpitations] : no palpitations [Reflux/Heartburn] : no reflux/ heartburn [Abdominal Pain] : no abdominal pain [Shortness Of Breath] : no shortness of breath [Hernia] : no hernia [Dysuria] : no dysuria

## 2020-01-22 NOTE — HISTORY OF PRESENT ILLNESS
[Procedure: ___] : Procedure performed: [unfilled]  [Date of Surgery: ___] : Date of Surgery:   [unfilled] [Surgeon Name:   ___] : Surgeon Name: Dr. UNGER [Pre-Op Weight ___] : Pre-op weight was [unfilled] lbs [de-identified] : Patient is s/p Laparoscopic sleeve gastrectomy on 03/18/2019. \par She is a little discouraged at her lack of progress since her prior visit.  She reports appropriate satiety, eats a balanced breakfast and  lunch, but often skips dinner.  She is exercising regularly as well. She does report continuing to lose inches.  She denies reflux or emesis, but does report a couple of episodes last week while she was taking antibiotics for a cold. \par She denies fever or chills, and overall feels well. \par

## 2020-01-22 NOTE — PHYSICAL EXAM
[Obese, well nourished, in no acute distress] : obese, well nourished, in no acute distress [Normal] : affect appropriate [de-identified] : Soft, nontender, nondistended. No masses, rebound or guarding. Incisions well-healed.  No hernias palpable.

## 2020-01-22 NOTE — PLAN
[FreeTextEntry1] : I reviewed importance of behavioral modification and follow-up in order to optimize outcomes and avoid complications. Post-operative nutrition again reviewed and reinforced, including the importance of taking supplements, making healthy food choices.  I encouraged her to keep a food diary and review this with our nutritionist at her next visit with me in one month. \par The importance of maintaining regular exercise/physical activity to maximize progress also reinforced. \par \par Recommend patient to see nutritionist and attend support groups.\par \par Patient's questions and concerns addressed to patient's satisfaction. \par \par

## 2020-01-22 NOTE — ASSESSMENT
[FreeTextEntry1] : Patient with history of severe obesity s/p sleeve gastrectomy. \par \par Patient is doing well overall, with excellent overall  progress, despite little progress since the last visit.  I think this may be attributable to imbalance between energy demands and intake, which is affecting her metabolism. Patient is tolerating an appropriate portion controlled and nutritionally balanced diet without difficulty.  \par \par

## 2020-02-26 ENCOUNTER — APPOINTMENT (OUTPATIENT)
Dept: SURGERY | Facility: CLINIC | Age: 55
End: 2020-02-26
Payer: COMMERCIAL

## 2020-02-26 VITALS
BODY MASS INDEX: 36.15 KG/M2 | HEIGHT: 65 IN | HEART RATE: 73 BPM | WEIGHT: 217 LBS | DIASTOLIC BLOOD PRESSURE: 90 MMHG | TEMPERATURE: 98.2 F | OXYGEN SATURATION: 95 % | SYSTOLIC BLOOD PRESSURE: 129 MMHG

## 2020-02-26 PROCEDURE — 99213 OFFICE O/P EST LOW 20 MIN: CPT

## 2020-02-26 NOTE — HISTORY OF PRESENT ILLNESS
[Procedure: ___] : Procedure performed: [unfilled]  [Date of Surgery: ___] : Date of Surgery:   [unfilled] [Pre-Op Weight ___] : Pre-op weight was [unfilled] lbs [Surgeon Name:   ___] : Surgeon Name: Dr. UNGER [de-identified] : Patient is s/p Laparoscopic sleeve gastrectomy on 03/18/2019. she is doing well, she is a little discouraged at her lack of progress since her prior visit. She reports appropriate satiety, eats a balanced breakfast and lunch, but often skips dinner. She is exercising regularly as well. She does report continuing to lose inches. She denies reflux or emesis, \par She denies fever or chills, and overall feels well.  [de-identified] : Patient reports no interval changes to her overall health status or medical history

## 2020-02-26 NOTE — PHYSICAL EXAM
[Obese, well nourished, in no acute distress] : obese, well nourished, in no acute distress [Normal] : affect appropriate [de-identified] : Soft, nontender, nondistended. No masses, rebound or guarding. Incisions well-healed.  No hernias palpable.

## 2020-02-26 NOTE — PLAN
[FreeTextEntry1] : I reviewed importance of behavioral modification and follow-up in order to optimize outcomes and avoid complications. Post-operative nutrition again reviewed and reinforced, including the importance of taking supplements, making healthy food choices. The importance of maintaining regular exercise/physical activity to maximize progress also reinforced.  . she has an appointment  scheduled with our nutritionist.  Informed patient about post-op support groups held every 3rd Tuesday of the month. Patient's questions and concerns addressed to patient's satisfaction.

## 2020-02-26 NOTE — ASSESSMENT
[FreeTextEntry1] :  \par Patient is doing well overall, with excellent overall progress, despite little progress since the last visit. I think this may be attributable to imbalance between energy demands and intake, which is affecting her metabolism. Patient is tolerating an appropriate portion controlled and nutritionally balanced diet without difficulty. \par

## 2020-05-27 ENCOUNTER — APPOINTMENT (OUTPATIENT)
Dept: SURGERY | Facility: CLINIC | Age: 55
End: 2020-05-27
Payer: COMMERCIAL

## 2020-05-27 VITALS — WEIGHT: 219 LBS | BODY MASS INDEX: 36.44 KG/M2

## 2020-05-27 PROCEDURE — 99213 OFFICE O/P EST LOW 20 MIN: CPT | Mod: 95

## 2020-05-27 NOTE — HISTORY OF PRESENT ILLNESS
[de-identified] : Patient is s/p Laparoscopic sleeve gastrectomy on 03/18/2019.  She feels very well overall and offers no complaints at this time.  She is overall very happy with her overall progess.  \par She is a little discouraged at her lack of progress in terms of weight loss since her prior visit, although she continues to lose inches and is now down to a size large from 3Xlarge.  She reports appropriate satiety.  She is exercising regularly as well, engaging in cardio  30 minutes every day.  SHe is having some difficulty with a meniscus tear in her knee, and will be addressing that with her orthopedic surgeon shortly.  She does also report significant loose skin over her lower abdomen which is causing irritation.  \par She denies fever or chills, and overall feels well. \par  [de-identified] : She reports no interval changes to her medical history or overall health status.

## 2020-05-27 NOTE — PHYSICAL EXAM
[Overweight] : overweight  [Normal] : affect appropriate [de-identified] : Breathing non-labored, not tachypneic

## 2020-05-27 NOTE — ASSESSMENT
[FreeTextEntry1] : Patient with history of severe obesity s/p sleeve gastrectomy. \par \par Patient is doing well overall, with excellent overall  progress. Patient is tolerating an appropriate portion controlled and nutritionally balanced diet without difficulty.  \par \par

## 2020-05-27 NOTE — PLAN
[FreeTextEntry1] : I reviewed importance of behavioral modification and follow-up in order to optimize outcomes and avoid complications. Post-operative nutrition again reviewed and reinforced, including the importance of taking supplements, making healthy food choices.  \par The importance of maintaining regular exercise/physical activity to maximize progress also reinforced. \par \par Recommend patient to see nutritionist and attend support groups.\par \par Patient's questions and concerns addressed to patient's satisfaction. \par \par She will return to see me in 6 months. \par \par She will also consult with a plastic surgeon to discuss reconstruction options. \par

## 2020-05-27 NOTE — REASON FOR VISIT
[Home] : at home, [unfilled] , at the time of the visit. [Verbal consent obtained from patient] : the patient, [unfilled] [Medical Office: ([facility])___] : at the medical office located in  [Follow-Up Visit] : a follow-up visit for [Morbid Obesity (BMI>40)] : morbid obesity (bmi>40)

## 2020-05-27 NOTE — REVIEW OF SYSTEMS
[Joint Pain] : joint pain [Skin Rash] : skin rash [Negative] : Heme/Lymph [Chills] : no chills [Fever] : no fever [Fatigue] : no fatigue [Odynophagia] : no odynophagia [Dysphagia] : no dysphagia [Chest Pain] : no chest pain [Palpitations] : no palpitations [Shortness Of Breath] : no shortness of breath [Wheezing] : no wheezing [Cough] : no cough [SOB on Exertion] : no shortness of breath during exertion [Abdominal Pain] : no abdominal pain [Reflux/Heartburn] : no reflux/ heartburn [Vomiting] : no vomiting [Hernia] : no hernia [Dysuria] : no dysuria [de-identified] : irritation under her abdominal sagging skin

## 2020-06-16 ENCOUNTER — APPOINTMENT (OUTPATIENT)
Dept: PLASTIC SURGERY | Facility: CLINIC | Age: 55
End: 2020-06-16
Payer: COMMERCIAL

## 2020-06-16 PROCEDURE — XXXXX: CPT

## 2020-09-02 ENCOUNTER — APPOINTMENT (OUTPATIENT)
Dept: BARIATRICS | Facility: CLINIC | Age: 55
End: 2020-09-02

## 2020-09-02 ENCOUNTER — APPOINTMENT (OUTPATIENT)
Dept: SURGERY | Facility: CLINIC | Age: 55
End: 2020-09-02
Payer: COMMERCIAL

## 2020-09-02 VITALS
DIASTOLIC BLOOD PRESSURE: 92 MMHG | WEIGHT: 229 LBS | HEIGHT: 65 IN | HEART RATE: 66 BPM | BODY MASS INDEX: 38.15 KG/M2 | TEMPERATURE: 98.4 F | SYSTOLIC BLOOD PRESSURE: 143 MMHG

## 2020-09-02 PROCEDURE — 99213 OFFICE O/P EST LOW 20 MIN: CPT

## 2020-09-02 NOTE — PHYSICAL EXAM
[Overweight] : overweight  [Normal] : affect appropriate [de-identified] : abdominal sagging pannus [de-identified] : Soft, nontender, nondistended. No masses, rebound or guarding.  No hernias palpable. [de-identified] : Breathing non-labored, not tachypneic

## 2020-09-02 NOTE — ASSESSMENT
[FreeTextEntry1] : Patient with history of severe obesity s/p sleeve gastrectomy. \par \par Patient is doing well since the prior visit, with excellent interval and overall  progress.  Patient is tolerating an appropriate portion controlled and nutritionally balanced diet without difficulty.  \par \par I explained to patient that the fact that she continues to lose inches indicates ongoing progress and her weight gain is likely due to increased muscle mass.  \par

## 2020-09-02 NOTE — REVIEW OF SYSTEMS
[Joint Pain] : joint pain [Skin Rash] : skin rash [Negative] : Heme/Lymph [Fever] : no fever [Chills] : no chills [Dysphagia] : no dysphagia [Fatigue] : no fatigue [Odynophagia] : no odynophagia [Chest Pain] : no chest pain [Palpitations] : no palpitations [Wheezing] : no wheezing [Shortness Of Breath] : no shortness of breath [Cough] : no cough [Abdominal Pain] : no abdominal pain [SOB on Exertion] : no shortness of breath during exertion [Reflux/Heartburn] : no reflux/ heartburn [Hernia] : no hernia [Vomiting] : no vomiting [de-identified] : irritation under her abdominal sagging skin [Dysuria] : no dysuria

## 2020-09-02 NOTE — HISTORY OF PRESENT ILLNESS
[Procedure: ___] : Procedure performed: [unfilled]  [Date of Surgery: ___] : Date of Surgery:   [unfilled] [Surgeon Name:   ___] : Surgeon Name: Dr. UNGER [Pre-Op Weight ___] : Pre-op weight was [unfilled] lbs [de-identified] : Patient is s/p Laparoscopic sleeve gastrectomy on 03/18/2019. \par \par Patient feels well overall and has no complaints at this time.  \par She is doing well with good interval progress in terms of lost inches, but she is concerned that she has gained some weight.  She is making good food choices, eating small portions and has rapid and prolonged satiety. Denies any complaints of fever, pain, diarrhea, heartburn, reflux, or vomiting. Normal bowel function. Normal urination.\par Compliant with supplements. \par Patient is engaging in regular exercise, including cardio and weights. \par \par Patient's medications, allergies, past medical, surgical, social and family histories were reviewed and updated as appropriate.  \par \par  [de-identified] : Patient reports no interval changes to medications, medical history or overall health status.\par

## 2020-09-02 NOTE — PLAN
[FreeTextEntry1] : I reviewed importance of behavioral modification and follow-up in order to optimize outcomes and avoid complications. Post-operative nutrition again reviewed and reinforced, including the importance of taking supplements, making healthy food choices.  \par The importance of maintaining regular exercise/physical activity to maximize progress also reinforced. \par \par She will be seeing our nutritionist today to review her intake and needs. \par \par Patient's questions and concerns addressed to patient's satisfaction. \par \par She will see me again in 6 months. \par

## 2021-03-03 ENCOUNTER — APPOINTMENT (OUTPATIENT)
Dept: SURGERY | Facility: CLINIC | Age: 56
End: 2021-03-03

## 2021-05-12 ENCOUNTER — APPOINTMENT (OUTPATIENT)
Dept: SURGERY | Facility: CLINIC | Age: 56
End: 2021-05-12
Payer: COMMERCIAL

## 2021-05-26 ENCOUNTER — APPOINTMENT (OUTPATIENT)
Dept: SURGERY | Facility: CLINIC | Age: 56
End: 2021-05-26
Payer: COMMERCIAL

## 2021-05-26 VITALS
SYSTOLIC BLOOD PRESSURE: 139 MMHG | HEART RATE: 73 BPM | DIASTOLIC BLOOD PRESSURE: 94 MMHG | WEIGHT: 233 LBS | BODY MASS INDEX: 38.82 KG/M2 | HEIGHT: 65 IN

## 2021-05-26 DIAGNOSIS — E66.01 MORBID (SEVERE) OBESITY DUE TO EXCESS CALORIES: ICD-10-CM

## 2021-05-26 PROCEDURE — 99213 OFFICE O/P EST LOW 20 MIN: CPT

## 2021-05-26 NOTE — PHYSICAL EXAM
[Overweight] : overweight  [Normal] : affect appropriate [de-identified] : Breathing non-labored, not tachypneic [de-identified] : Soft, nontender, nondistended. No masses, rebound or guarding.  No hernias palpable. [de-identified] : abdominal sagging pannus

## 2021-05-26 NOTE — REVIEW OF SYSTEMS
[Recent Change In Weight] : ~T recent weight change [Joint Pain] : joint pain [Joint Stiffness] : joint stiffness [Skin Rash] : skin rash [Negative] : Heme/Lymph [Fever] : no fever [Chills] : no chills [Fatigue] : no fatigue [Vision Problems] : no vision problems [Dysphagia] : no dysphagia [Odynophagia] : no odynophagia [Chest Pain] : no chest pain [Palpitations] : no palpitations [Shortness Of Breath] : no shortness of breath [Wheezing] : no wheezing [Cough] : no cough [SOB on Exertion] : no shortness of breath during exertion [Abdominal Pain] : no abdominal pain [Vomiting] : no vomiting [Reflux/Heartburn] : no reflux/ heartburn [Hernia] : no hernia [Dysuria] : no dysuria [de-identified] : irritation under her abdominal sagging skin

## 2021-05-26 NOTE — HISTORY OF PRESENT ILLNESS
[de-identified] : Patient is s/p Laparoscopic sleeve gastrectomy on 03/18/2019. \par She states that she continues to have rapid satiety, and occasion does have some emesis if she eats too fast.  She reports continuing to lose inches,, but is dismayed by the lack of progress in terms of weight loss.  She reports exercising on a regular basis.  She has had some difficulty with knee pain in her right knee (and had a cortisone injection recently) as well as back pain.  \par She has consulted with a plastic surgeon about reconstructive surgery and is hopeful that she will be able to proceed with this once she loses a little more weight. [de-identified] : Patient reports no interval changes to medications, medical history or overall health status.\par

## 2021-05-26 NOTE — ASSESSMENT
[FreeTextEntry1] : Patient with history of severe obesity s/p sleeve gastrectomy. \par \par At this point I think that her progress may be hampered by her nutritional choices and some level of maladaptive eating.  I think she has done well overall, but with some redirection she will be able to continue with additional weight loss.\par \par

## 2021-05-26 NOTE — PLAN
[FreeTextEntry1] : I reviewed importance of behavioral modification and follow-up in order to optimize outcomes and avoid complications. Post-operative nutrition again reviewed and reinforced, including the importance of taking supplements, making healthy food choices.  \par The importance of maintaining regular exercise/physical activity to maximize progress also reinforced. \par \par Recommend patient to see nutritionist today. \par \par I also encouraged her to get the COVID vaccine, as she is in a high risk category.  \par \par Patient's questions and concerns addressed to patient's satisfaction. \par \par She will return to see me in three months. \par \par \par

## 2021-09-01 ENCOUNTER — APPOINTMENT (OUTPATIENT)
Dept: SURGERY | Facility: CLINIC | Age: 56
End: 2021-09-01
Payer: COMMERCIAL

## 2021-09-01 ENCOUNTER — APPOINTMENT (OUTPATIENT)
Dept: BARIATRICS | Facility: CLINIC | Age: 56
End: 2021-09-01

## 2021-09-08 ENCOUNTER — APPOINTMENT (OUTPATIENT)
Dept: SURGERY | Facility: CLINIC | Age: 56
End: 2021-09-08
Payer: COMMERCIAL

## 2021-09-08 VITALS — BODY MASS INDEX: 37.28 KG/M2 | WEIGHT: 224 LBS

## 2021-09-08 PROCEDURE — 99212 OFFICE O/P EST SF 10 MIN: CPT | Mod: 95

## 2021-09-08 NOTE — PHYSICAL EXAM
[Normal] : affect appropriate [de-identified] : Via telephone.  Patient not in any distress.   [de-identified] : Breathing non-labored. Not tachypneic.

## 2021-09-08 NOTE — PLAN
[FreeTextEntry1] : I explained to her that to the only surgical revision that I would recommend is to a gastric bypass, which she is not really interested in at this time.  I also gave her the name of a colleague who has had some success with pharmacotherapy for patients who have had weight regain after weight loss surgery.  She is interested in pursuing this and will consult with her.\par Patient's questions and concerns addressed to her satisfaction and she will return to see me in 6 months.

## 2021-09-08 NOTE — ASSESSMENT
[FreeTextEntry1] : Patient overall with great success after her sleeve gastrectomy, but with plateauing weight loss.  She experiences appropriate satiety and has been following nutritional recommendations including regular exercise, but her weight loss has not progressed since her prior visit.

## 2021-09-08 NOTE — HISTORY OF PRESENT ILLNESS
[de-identified] : Patient is s/p Laparoscopic sleeve gastrectomy on 03/18/2019. She states that she feels well overall, and continues to feel rapid and appropriate satiety, and is following the recommendations made by our nutritionist and which afforded her great success with her surgery, but she is frustrated that her weight loss has plateaued.  She exercises regularly and is mindful of her intake, but she sees little progress.  She is exploring the possibility of having reconstructive surgery for removal of her excess skin, but is reluctant to do so as she would like to lose additional weight.  She is not really interested in revision to a gastric bypass and is more interested in other nonsurgical options which may afford her additional weight loss. [de-identified] : Patient reports no interval changes to medications, medical history or overall health status.\par

## 2021-09-08 NOTE — REVIEW OF SYSTEMS
[Recent Change In Weight] : ~T recent weight change [Joint Pain] : joint pain [Joint Stiffness] : joint stiffness [Skin Rash] : skin rash [Negative] : Heme/Lymph [Fever] : no fever [Chills] : no chills [Fatigue] : no fatigue [Vision Problems] : no vision problems [Dysphagia] : no dysphagia [Odynophagia] : no odynophagia [Chest Pain] : no chest pain [Palpitations] : no palpitations [Shortness Of Breath] : no shortness of breath [Wheezing] : no wheezing [Cough] : no cough [SOB on Exertion] : no shortness of breath during exertion [Abdominal Pain] : no abdominal pain [Vomiting] : no vomiting [Reflux/Heartburn] : no reflux/ heartburn [Hernia] : no hernia [Dysuria] : no dysuria [de-identified] : irritation under her abdominal sagging skin

## 2021-12-13 ENCOUNTER — NON-APPOINTMENT (OUTPATIENT)
Age: 56
End: 2021-12-13

## 2021-12-28 ENCOUNTER — NON-APPOINTMENT (OUTPATIENT)
Age: 56
End: 2021-12-28

## 2022-01-05 ENCOUNTER — APPOINTMENT (OUTPATIENT)
Dept: SURGERY | Facility: CLINIC | Age: 57
End: 2022-01-05

## 2022-04-13 ENCOUNTER — APPOINTMENT (OUTPATIENT)
Dept: SURGERY | Facility: CLINIC | Age: 57
End: 2022-04-13
Payer: COMMERCIAL

## 2022-04-13 VITALS
OXYGEN SATURATION: 96 % | SYSTOLIC BLOOD PRESSURE: 130 MMHG | WEIGHT: 235 LBS | BODY MASS INDEX: 39.15 KG/M2 | DIASTOLIC BLOOD PRESSURE: 82 MMHG | HEIGHT: 65 IN | HEART RATE: 75 BPM

## 2022-04-13 VITALS — TEMPERATURE: 97 F

## 2022-04-13 PROCEDURE — 99214 OFFICE O/P EST MOD 30 MIN: CPT

## 2022-04-13 RX ORDER — OMEPRAZOLE 40 MG/1
40 CAPSULE, DELAYED RELEASE ORAL
Qty: 30 | Refills: 3 | Status: ACTIVE | COMMUNITY
Start: 2022-04-13 | End: 1900-01-01

## 2022-04-13 NOTE — PHYSICAL EXAM
[Overweight] : overweight  [Normal] : affect appropriate [de-identified] : Via telephone.  Patient not in any distress.   [de-identified] : Breathing non-labored, not tachypneic [de-identified] : Soft, nontender, nondistended. No masses, rebound or guarding.  No hernias palpable. [de-identified] : abdominal sagging pannus

## 2022-04-13 NOTE — CONSULT LETTER
[Dear  ___] : Dear  [unfilled], [Courtesy Letter:] : I had the pleasure of seeing your patient, [unfilled], in my office today. [Please see my note below.] : Please see my note below. [Consult Closing:] : Thank you very much for allowing me to participate in the care of this patient.  If you have any questions, please do not hesitate to contact me. [Sincerely,] : Sincerely, [FreeTextEntry3] : Martin

## 2022-04-13 NOTE — ASSESSMENT
[FreeTextEntry1] : Patient with history of severe obesity s/p sleeve gastrectomy. \par \par At this point I think that her progress may be hampered by her nutritional choices and some level of maladaptive eating.  I think she has done well overall, but with some redirection she will be able to continue with additional weight loss.  She may also not be taking insufficient intake given her level of exercise and activity.\par \par

## 2022-04-13 NOTE — HISTORY OF PRESENT ILLNESS
[de-identified] : Patient is s/p Laparoscopic sleeve gastrectomy on 03/18/2019. \par \par Patient feels well overall and has no complaints at this time.  She is however, discouraged by some recent weight gain.  She states that she does feel full rapidly, and if she eats too fast or too much she does have some emesis, but this is uncommon.  She is going to the gym and exercising every day.  She does state that her clothing size has remained stable at a size 14 despite some weight gain.  Patient is making good food choices, eating small portions and has rapid and prolonged satiety. \par She has occasional constipation and takes Senokot for that.\par Compliant with supplements. \par Patient is engaging in regular exercise. \par \par Patient's medications, allergies, past medical, surgical, social and family histories were reviewed and updated as appropriate.  \par \par  [de-identified] : Patient reports no interval changes to medications, medical history or overall health status.\par

## 2022-04-13 NOTE — REVIEW OF SYSTEMS
[Recent Change In Weight] : ~T recent weight change [Joint Pain] : joint pain [Joint Stiffness] : joint stiffness [Skin Rash] : skin rash [Negative] : Heme/Lymph [Fever] : no fever [Chills] : no chills [Fatigue] : no fatigue [Vision Problems] : no vision problems [Dysphagia] : no dysphagia [Odynophagia] : no odynophagia [Chest Pain] : no chest pain [Palpitations] : no palpitations [Shortness Of Breath] : no shortness of breath [Wheezing] : no wheezing [Cough] : no cough [SOB on Exertion] : no shortness of breath during exertion [Abdominal Pain] : no abdominal pain [Vomiting] : no vomiting [Reflux/Heartburn] : no reflux/ heartburn [Hernia] : no hernia [Dysuria] : no dysuria [de-identified] : irritation under her abdominal sagging skin

## 2022-04-13 NOTE — PLAN
[FreeTextEntry1] : I reviewed importance of behavioral modification and follow-up in order to optimize outcomes and avoid complications. Post-operative nutrition again reviewed and reinforced, including the importance of taking supplements, making healthy food choices.  \par The importance of maintaining regular exercise/physical activity to maximize progress also reinforced. \par \par Recommend patient to see nutritionist today.  I also recommended that she follow-up with her medical weight management team as well, which she is agreeable to.\par \par Patient's questions and concerns addressed to patient's satisfaction. \par \par She will return to see me in three months. \par \par \par

## 2022-05-17 NOTE — HISTORY OF PRESENT ILLNESS
[Procedure: ___] : Procedure performed: [unfilled]  [Date of Surgery: ___] : Date of Surgery:   [unfilled] [Surgeon Name:   ___] : Surgeon Name: Dr. UNGER [Pre-Op Weight ___] : Pre-op weight was [unfilled] lbs [___ Months Post Op] : [unfilled] months

## 2022-05-18 ENCOUNTER — APPOINTMENT (OUTPATIENT)
Dept: SURGERY | Facility: CLINIC | Age: 57
End: 2022-05-18

## 2022-10-11 ENCOUNTER — APPOINTMENT (OUTPATIENT)
Dept: INTERNAL MEDICINE | Facility: CLINIC | Age: 57
End: 2022-10-11
Payer: COMMERCIAL

## 2022-10-11 VITALS
BODY MASS INDEX: 37.51 KG/M2 | OXYGEN SATURATION: 97 % | SYSTOLIC BLOOD PRESSURE: 118 MMHG | DIASTOLIC BLOOD PRESSURE: 80 MMHG | WEIGHT: 225.13 LBS | HEIGHT: 65 IN | HEART RATE: 82 BPM

## 2022-10-11 DIAGNOSIS — Z86.69 PERSONAL HISTORY OF OTHER DISEASES OF THE NERVOUS SYSTEM AND SENSE ORGANS: ICD-10-CM

## 2022-10-11 PROCEDURE — ZZZZZ: CPT | Mod: 1L

## 2022-10-12 NOTE — END OF VISIT
Chief Complaint   Patient presents with   • Congestive Heart Failure   • Edema     & Leg swelling       Subjective:   Rosmery Landaverde is a 75 y.o. female who presents today for cardiac care and management in a heart failure clinic due to leg swelling and shortness of breath.  Patient also has a diagnosis of diabetes and hypertension.    Patient still gets winded with daily living activities and exertion. No symptoms at rest.    We do not have any records on prior cardiac work-up at this time.    No family history of sudden cardiac death.    I personally interpreted EKG tracing which shows sinus rhythm without evidence of coronary ischemia.    No blood test done.    Her legs swelling improved with Spironolactone 25 mg daily and Furosemide 40 mg daily.    History reviewed. No pertinent past medical history.  History reviewed. No pertinent surgical history.  History reviewed. No pertinent family history.  Social History     Socioeconomic History   • Marital status: Single     Spouse name: Not on file   • Number of children: Not on file   • Years of education: Not on file   • Highest education level: Not on file   Occupational History   • Not on file   Tobacco Use   • Smoking status: Current Some Day Smoker     Types: Cigarettes     Start date: 6/10/1962   • Smokeless tobacco: Never Used   Vaping Use   • Vaping Use: Never used   Substance and Sexual Activity   • Alcohol use: Yes     Alcohol/week: 2.4 oz     Types: 4 Cans of beer per week   • Drug use: Never   • Sexual activity: Not on file   Other Topics Concern   • Not on file   Social History Narrative   • Not on file     Social Determinants of Health     Financial Resource Strain:    • Difficulty of Paying Living Expenses:    Food Insecurity:    • Worried About Running Out of Food in the Last Year:    • Ran Out of Food in the Last Year:    Transportation Needs:    • Lack of Transportation (Medical):    • Lack of Transportation (Non-Medical):    Physical Activity:   [FreeTextEntry3] : history of VSG with slight weight regain during pandemic\par return to RD at [facility] - needs to reduce added fats and increase fiber intake\par start phentermine/topiramate "  • Days of Exercise per Week:    • Minutes of Exercise per Session:    Stress:    • Feeling of Stress :    Social Connections:    • Frequency of Communication with Friends and Family:    • Frequency of Social Gatherings with Friends and Family:    • Attends Sabianism Services:    • Active Member of Clubs or Organizations:    • Attends Club or Organization Meetings:    • Marital Status:    Intimate Partner Violence:    • Fear of Current or Ex-Partner:    • Emotionally Abused:    • Physically Abused:    • Sexually Abused:      Allergies   Allergen Reactions   • Morphine      Severe reaction      Outpatient Encounter Medications as of 6/28/2021   Medication Sig Dispense Refill   • furosemide (LASIX) 40 MG Tab Take 1 tablet by mouth every day. 30 tablet 11   • spironolactone (ALDACTONE) 25 MG Tab Take 1 tablet by mouth every day. 30 tablet 3     No facility-administered encounter medications on file as of 6/28/2021.     Review of Systems   Constitutional: Negative for diaphoresis and fever.   HENT: Negative for nosebleeds.    Eyes: Negative for blurred vision and double vision.   Respiratory: Positive for shortness of breath. Negative for cough.    Cardiovascular: Positive for leg swelling. Negative for chest pain and palpitations.   Gastrointestinal: Negative for abdominal pain.   Genitourinary: Negative for dysuria and frequency.   Musculoskeletal: Negative for falls and myalgias.   Skin: Negative for rash.   Neurological: Negative for dizziness, sensory change and headaches.   Endo/Heme/Allergies: Does not bruise/bleed easily.   Psychiatric/Behavioral: Negative for depression and memory loss.        Objective:   BP (!) 0/0 (BP Location: Right arm, Patient Position: Sitting, BP Cuff Size: Adult)   Ht 1.549 m (5' 1\")   BMI 41.19 kg/m²     Physical Exam   Constitutional: She is oriented to person, place, and time. No distress.   HENT:   Head: Normocephalic and atraumatic.   Right Ear: External ear normal.   Left Ear: " [Time Spent: ___ minutes] : I have spent [unfilled] minutes of time on the encounter. External ear normal.   Eyes: Right eye exhibits no discharge. Left eye exhibits no discharge.   Neck: No JVD present. No thyromegaly present.   Cardiovascular: Normal rate, regular rhythm and normal heart sounds. Exam reveals no gallop and no friction rub.   No murmur heard.  Pulmonary/Chest: Breath sounds normal. No respiratory distress.   Abdominal: Bowel sounds are normal. She exhibits no distension. There is no abdominal tenderness.   Musculoskeletal:         General: Swelling present. No tenderness.      Right lower leg: Edema present.      Left lower leg: Edema present.   Neurological: She is alert and oriented to person, place, and time. No cranial nerve deficit.   Skin: Skin is warm and dry. She is not diaphoretic.   Psychiatric: Her behavior is normal.   Nursing note and vitals reviewed.      Assessment:     1. Leg swelling     2. Dyspnea on effort     3. Type 2 diabetes mellitus with hyperglycemia, without long-term current use of insulin (HCC)     4. Palpitations       Medical Decision Making:  Today's Assessment / Status / Plan:   Patient does have significant swelling in her legs.  Patient appears to be volume overloaded.  At this time, I will continue spironolactone along with furosemide for diuresis. Cannot go up in dosage due to no new blood test done.  Waiting for transthoracic echocardiogram to obtain for cardiac function and valvular function.  I will continue to closely monitor for side effects of patient's high risk medication(s) including liver, renal function and electrolytes.

## 2022-10-12 NOTE — HISTORY OF PRESENT ILLNESS
[Commerial Program: _____] : commercial program: [unfilled] [Herbal Supplements: _____] : herbal supplements: [unfilled] [Sleeve] : Bariatric surgery (sleeve) [Maintenance Weight: ___] : Maintenance weight: [unfilled] [For how long: _____] : For how long: [unfilled] [Work stress] : work stress [Accountability (having someone/group to report to)] : accountability (having someone/group to report to) [Frequent weight ins/follow ups] : Frequent weight ins/follow ups [Weighing all my meals] : Weighing all my meals [Having a specific meal plan to follow] : having a specific meal plan to follow [It was too much work/big time commitment] : it was too much work/big time commitment [I usually sleep 4-6 hours] : I usually sleep 4-6 hours [2] : Cups of coffee per day: 2 [Creamer] : creamer [Other: ____] : [unfilled] [Self] : self [Improved Health] : Improved health [Quality of Life] : Quality of life [Improved Mobility] : Improved mobility [Improved Looks/Aesthetics] : Improved looks/aesthetics [Improve Mood] : Improved mood [Middle Age (45-65)] : middle age (45-65) [0-1 nights per week] : I use a CPAP machine 0-1 nights per week [Breakfast] : breakfast [Lunch] : lunch [Dinner] : dinner [Evening] : evening [Other: ___] : [unfilled] [0] : How many cups of juice do you drink per day: 0 [Cardio machines: treadmill/spinning/elliptical] : cardio machines: treadmill/spinning/elliptical [Weight training] : weight training [7] : 7 [30] : 30 [DAVIS] : DAVIS [FreeTextEntry2] : 214 [FreeTextEntry3] : 471 [] : No [FreeTextEntry1] : Referred by her surgeon, Dr. 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

## 2022-11-07 ENCOUNTER — RX RENEWAL (OUTPATIENT)
Age: 57
End: 2022-11-07

## 2022-11-15 ENCOUNTER — APPOINTMENT (OUTPATIENT)
Dept: INTERNAL MEDICINE | Facility: CLINIC | Age: 57
End: 2022-11-15

## 2022-11-15 ENCOUNTER — OUTPATIENT (OUTPATIENT)
Dept: OUTPATIENT SERVICES | Facility: HOSPITAL | Age: 57
LOS: 1 days | End: 2022-11-15
Payer: COMMERCIAL

## 2022-11-15 DIAGNOSIS — I10 ESSENTIAL (PRIMARY) HYPERTENSION: ICD-10-CM

## 2022-11-15 DIAGNOSIS — Z98.51 TUBAL LIGATION STATUS: Chronic | ICD-10-CM

## 2022-11-15 DIAGNOSIS — Z98.890 OTHER SPECIFIED POSTPROCEDURAL STATES: Chronic | ICD-10-CM

## 2022-11-15 PROCEDURE — G0463: CPT

## 2022-11-15 PROCEDURE — 99213 OFFICE O/P EST LOW 20 MIN: CPT | Mod: GC

## 2022-11-25 NOTE — HISTORY OF PRESENT ILLNESS
[Home] : at home, [unfilled] , at the time of the visit. [Medical Office: ([facility])___] : at the medical office located in  [Verbal consent obtained from patient] : the patient, [unfilled] [FreeTextEntry1] : 58 y/o F with PMH DAVIS asthma, sleeve gastrectomy (3/2019) who presents for follow-up. She was started on topiramate and phentermine at last visit. \par \par Pre-op weight: 270 lbs. Now about 264 pounds. PT \par \par Pt reports she is tolerating topiramate and phentermine well. \par \par Exercise: Works out at home every day. Does pilates or walks on treadmill\par \par Diet: egg whites, creat of wheat, fruits, tuna, salad, soups, squash\par \par Sleeping well

## 2022-11-25 NOTE — PHYSICAL EXAM
[Obese, well nourished, in no acute distress] : obese, well nourished, in no acute distress [FreeTextEntry1] : Telephone visit. No physical exam performed

## 2022-11-25 NOTE — ASSESSMENT
[FreeTextEntry1] : 56 y/o F with PMH DAVIS asthma, sleeve gastrectomy (3/2019) who presents for follow-up. She was started on topiramate and phentermine at last visit. \par \par #Obesity\par -continue with phentermine 15 mg daily and topiramate 50 mg at bedtime\par -encouraged to continue daily exercise\par -advised to increase vegetable intake\par \par RTC in 5 weeks

## 2022-11-25 NOTE — REVIEW OF SYSTEMS
[MED-ROS-Cardiovas-FT] : no palpitations [MED-ROS-Gastro-FT] : no diarrhea [MED-ROS-Neuro-FT] : no numbness

## 2022-11-29 DIAGNOSIS — E66.01 MORBID (SEVERE) OBESITY DUE TO EXCESS CALORIES: ICD-10-CM

## 2022-12-27 ENCOUNTER — APPOINTMENT (OUTPATIENT)
Dept: INTERNAL MEDICINE | Facility: CLINIC | Age: 57
End: 2022-12-27
Payer: COMMERCIAL

## 2022-12-27 ENCOUNTER — OUTPATIENT (OUTPATIENT)
Dept: OUTPATIENT SERVICES | Facility: HOSPITAL | Age: 57
LOS: 1 days | End: 2022-12-27
Payer: COMMERCIAL

## 2022-12-27 DIAGNOSIS — I10 ESSENTIAL (PRIMARY) HYPERTENSION: ICD-10-CM

## 2022-12-27 DIAGNOSIS — Z98.890 OTHER SPECIFIED POSTPROCEDURAL STATES: Chronic | ICD-10-CM

## 2022-12-27 DIAGNOSIS — Z98.51 TUBAL LIGATION STATUS: Chronic | ICD-10-CM

## 2022-12-27 PROCEDURE — 99214 OFFICE O/P EST MOD 30 MIN: CPT | Mod: GC

## 2022-12-27 PROCEDURE — G0463: CPT

## 2023-01-03 NOTE — ASSESSMENT
[FreeTextEntry1] : 58 y/o F with PMH DAVIS asthma, sleeve gastrectomy (3/2019) who presents for follow-up.\par \par #Obesity \par - patient lost 11 pounds since October \par - counseled patient on healthy diet\par - advised patient to increase exercise as tolerated as she recovers from fall \par - continue with phentermine 15mg daily, topiramate 50mg at bedtime. No side effects. \par \par Return to the clinic in 5-10 weeks, or next available appointment \par \par Alyssa Wilson MD\par PGY2 Internal Medicine\par

## 2023-01-03 NOTE — HISTORY OF PRESENT ILLNESS
[Home] : at home, [unfilled] , at the time of the visit. [Medical Office: ([facility])___] : at the medical office located in  [Verbal consent obtained from patient] : the patient, [unfilled] [FreeTextEntry1] : 56 y/o F with PMH DAVIS asthma, sleeve gastrectomy (3/2019) who presents for follow-up.\par \par Weight: Patient down to 214 (225 in October). Feels like was weight loss was slowing down but has increased again. \par \par Diet\par Morning: egg whites, not too hungry in the morning, slice of wheat toast \par Lunch: fruit \par Dinner: fish or chicken, baked pork chop, baked chicken, lots of vegetables, salads\par Snacks: not too many snacks, yogurt, likes cheese and crackers but keeps to a minimum \par \par Exercise: Lot of cardio, weight lifting. Patient had a fall down the steps at a party 12/3/22, had to get a cortisone shot. Was still weight lifting sitting down. Now recovered ans exercising again \par \par Sleep: sleeps great, better than in the past. \par \par Stress: stress levels good, better than in the past. \par \par Medications: phentermine 15mg daily, topiramate 50mg at bedtime. No side effects. Not feeling anxious or drowsy.

## 2023-01-05 DIAGNOSIS — E66.01 MORBID (SEVERE) OBESITY DUE TO EXCESS CALORIES: ICD-10-CM

## 2023-01-27 ENCOUNTER — NON-APPOINTMENT (OUTPATIENT)
Age: 58
End: 2023-01-27

## 2023-03-07 ENCOUNTER — NON-APPOINTMENT (OUTPATIENT)
Age: 58
End: 2023-03-07

## 2023-04-10 ENCOUNTER — NON-APPOINTMENT (OUTPATIENT)
Age: 58
End: 2023-04-10

## 2023-04-10 RX ORDER — PHENTERMINE HYDROCHLORIDE 15 MG/1
15 CAPSULE ORAL
Qty: 30 | Refills: 0 | Status: COMPLETED | COMMUNITY
Start: 2022-10-11 | End: 2023-04-10

## 2023-05-08 ENCOUNTER — NON-APPOINTMENT (OUTPATIENT)
Age: 58
End: 2023-05-08

## 2023-05-10 ENCOUNTER — NON-APPOINTMENT (OUTPATIENT)
Age: 58
End: 2023-05-10

## 2023-06-17 ENCOUNTER — RX RENEWAL (OUTPATIENT)
Age: 58
End: 2023-06-17

## 2023-07-17 ENCOUNTER — RX RENEWAL (OUTPATIENT)
Age: 58
End: 2023-07-17

## 2023-07-25 ENCOUNTER — OUTPATIENT (OUTPATIENT)
Dept: OUTPATIENT SERVICES | Facility: HOSPITAL | Age: 58
LOS: 1 days | End: 2023-07-25
Payer: COMMERCIAL

## 2023-07-25 ENCOUNTER — APPOINTMENT (OUTPATIENT)
Dept: INTERNAL MEDICINE | Facility: CLINIC | Age: 58
End: 2023-07-25
Payer: COMMERCIAL

## 2023-07-25 DIAGNOSIS — I10 ESSENTIAL (PRIMARY) HYPERTENSION: ICD-10-CM

## 2023-07-25 DIAGNOSIS — Z98.51 TUBAL LIGATION STATUS: Chronic | ICD-10-CM

## 2023-07-25 DIAGNOSIS — E66.01 MORBID (SEVERE) OBESITY DUE TO EXCESS CALORIES: ICD-10-CM

## 2023-07-25 DIAGNOSIS — Z98.890 OTHER SPECIFIED POSTPROCEDURAL STATES: Chronic | ICD-10-CM

## 2023-07-25 PROCEDURE — G0463: CPT

## 2023-07-25 PROCEDURE — 99442: CPT

## 2023-07-25 NOTE — REASON FOR VISIT
[Home] : at home, [unfilled] , at the time of the visit. [Medical Office: ([facility])___] : at the medical office located in  [Verbal consent obtained from patient] : the patient, [unfilled] [Follow-Up] : a follow-up visit

## 2023-07-31 NOTE — HISTORY OF PRESENT ILLNESS
[FreeTextEntry1] : 59 y/o F with PMH DAVIS asthma, sleeve gastrectomy (3/2019) who presents for follow-up.\par \par Current weight is 197-203 lbs (last weight when it started as 225 lbs) x3m since being on phentermine 37.5/topiramate 50mg since 10/2022 -- no side effects. Target weight is 175lbs, and feels like she's plateaued and wants additional help. In November, 2 large toenail discoloration and cold in sensation. Had seen physicians since, who stated that it was just a brittle nail that pt needs to wait for it to grow out. \par \par Diet: AM: hard boiled egg or yogurt, or protein shakes w/ coffee (w. a little sugar). At lunch, eats turkey sandwich on rye/whole wheat. At dinner, eats salmon, chicken or steak w/ potato (sweet) - air fried food. Snacks w/ sweet desserts or Italian ices (w/o sugar) or guac/chips 2-3x/week, often snacking closer to bedtime. \par \par Exercise: heavy duty weights every day, aerobics also (treadmill) every day x2 years.

## 2023-07-31 NOTE — END OF VISIT
[] : Resident [FreeTextEntry3] : will increase phentermine/topiramate dosing in setting of weight plateau [Time Spent: ___ minutes] : I have spent [unfilled] minutes of time on the encounter.

## 2023-07-31 NOTE — ASSESSMENT
[FreeTextEntry1] : 59 y/o F with PMH DAVIS asthma, sleeve gastrectomy (3/2019) who presents for follow-up.\par \par #Obesity:\par - currently on phentermine 37.5/topiramate 50mg qd, but weight has plateaued in the last 3 months and would like further weight loss\par - recommended continuing with max phentermine 37.5mg and going up to topiramate 75mg qhs for a week and then if tolerated, up to 100mg qhs of topiramate.\par - RTC clinic in 10 weeks or sooner as needed

## 2023-09-19 ENCOUNTER — RX RENEWAL (OUTPATIENT)
Age: 58
End: 2023-09-19

## 2023-10-18 ENCOUNTER — RX RENEWAL (OUTPATIENT)
Age: 58
End: 2023-10-18

## 2023-10-24 ENCOUNTER — NON-APPOINTMENT (OUTPATIENT)
Age: 58
End: 2023-10-24

## 2023-10-24 ENCOUNTER — OUTPATIENT (OUTPATIENT)
Dept: OUTPATIENT SERVICES | Facility: HOSPITAL | Age: 58
LOS: 1 days | End: 2023-10-24
Payer: COMMERCIAL

## 2023-10-24 ENCOUNTER — APPOINTMENT (OUTPATIENT)
Dept: INTERNAL MEDICINE | Facility: CLINIC | Age: 58
End: 2023-10-24
Payer: COMMERCIAL

## 2023-10-24 VITALS
DIASTOLIC BLOOD PRESSURE: 90 MMHG | HEIGHT: 65 IN | HEART RATE: 97 BPM | BODY MASS INDEX: 33.86 KG/M2 | WEIGHT: 203.25 LBS | SYSTOLIC BLOOD PRESSURE: 116 MMHG | OXYGEN SATURATION: 97 %

## 2023-10-24 DIAGNOSIS — G47.33 OBSTRUCTIVE SLEEP APNEA (ADULT) (PEDIATRIC): ICD-10-CM

## 2023-10-24 DIAGNOSIS — E66.01 MORBID (SEVERE) OBESITY DUE TO EXCESS CALORIES: ICD-10-CM

## 2023-10-24 DIAGNOSIS — Z98.890 OTHER SPECIFIED POSTPROCEDURAL STATES: Chronic | ICD-10-CM

## 2023-10-24 DIAGNOSIS — Z98.51 TUBAL LIGATION STATUS: Chronic | ICD-10-CM

## 2023-10-24 PROCEDURE — G0463: CPT

## 2023-10-24 PROCEDURE — 99214 OFFICE O/P EST MOD 30 MIN: CPT

## 2023-11-16 ENCOUNTER — RX RENEWAL (OUTPATIENT)
Age: 58
End: 2023-11-16

## 2023-11-28 ENCOUNTER — APPOINTMENT (OUTPATIENT)
Dept: INTERNAL MEDICINE | Facility: CLINIC | Age: 58
End: 2023-11-28
Payer: COMMERCIAL

## 2023-11-28 ENCOUNTER — NON-APPOINTMENT (OUTPATIENT)
Age: 58
End: 2023-11-28

## 2023-11-28 PROCEDURE — 99443: CPT

## 2023-12-21 DIAGNOSIS — I10 ESSENTIAL (PRIMARY) HYPERTENSION: ICD-10-CM

## 2024-01-02 ENCOUNTER — APPOINTMENT (OUTPATIENT)
Dept: INTERNAL MEDICINE | Facility: CLINIC | Age: 59
End: 2024-01-02

## 2024-01-02 ENCOUNTER — NON-APPOINTMENT (OUTPATIENT)
Age: 59
End: 2024-01-02

## 2024-01-02 NOTE — ASSESSMENT
[FreeTextEntry1] : 59 y/o F with PMH s/p bariatric sleeve surgery (3/18/2019), DAVIS, asthma who presents for follow-up.  RTC in 10 weeks for f/u.  Case discussed w/ Dr. Cobb.  Marsha Murphy, PGY-2

## 2024-01-02 NOTE — HISTORY OF PRESENT ILLNESS
[FreeTextEntry1] : 59 y/o F with PMH s/p bariatric sleeve surgery (3/18/2019), DAVIS, asthma who presents for weight management follow-up.  Current wt: Previous wt: 200 lbs (11/28/23) Currently taking topiramate 100mg w/ no side effects. Phentermine previously discontinued d/t palpitations.   Diet: - Breakfast:  - Lunch:  - Dinner:  - Snacks:  - Beverages:   Exercise:   Sleep: 6-7 hrs/night of uninterrupted sleep  Stress:  Medications and tolerance: Pt reports tolerating Topiramate well without side effects.

## 2024-02-06 ENCOUNTER — OUTPATIENT (OUTPATIENT)
Dept: OUTPATIENT SERVICES | Facility: HOSPITAL | Age: 59
LOS: 1 days | End: 2024-02-06
Payer: COMMERCIAL

## 2024-02-06 ENCOUNTER — APPOINTMENT (OUTPATIENT)
Dept: INTERNAL MEDICINE | Facility: CLINIC | Age: 59
End: 2024-02-06
Payer: COMMERCIAL

## 2024-02-06 ENCOUNTER — NON-APPOINTMENT (OUTPATIENT)
Age: 59
End: 2024-02-06

## 2024-02-06 VITALS — BODY MASS INDEX: 33.78 KG/M2 | WEIGHT: 203 LBS

## 2024-02-06 DIAGNOSIS — Z98.890 OTHER SPECIFIED POSTPROCEDURAL STATES: Chronic | ICD-10-CM

## 2024-02-06 DIAGNOSIS — Z98.51 TUBAL LIGATION STATUS: Chronic | ICD-10-CM

## 2024-02-06 DIAGNOSIS — E66.01 MORBID (SEVERE) OBESITY DUE TO EXCESS CALORIES: ICD-10-CM

## 2024-02-06 PROCEDURE — 99442: CPT | Mod: GC

## 2024-02-06 PROCEDURE — G0463: CPT

## 2024-02-07 DIAGNOSIS — I10 ESSENTIAL (PRIMARY) HYPERTENSION: ICD-10-CM

## 2024-03-12 ENCOUNTER — APPOINTMENT (OUTPATIENT)
Dept: INTERNAL MEDICINE | Facility: CLINIC | Age: 59
End: 2024-03-12
Payer: COMMERCIAL

## 2024-03-12 ENCOUNTER — OUTPATIENT (OUTPATIENT)
Dept: OUTPATIENT SERVICES | Facility: HOSPITAL | Age: 59
LOS: 1 days | End: 2024-03-12
Payer: COMMERCIAL

## 2024-03-12 VITALS — BODY MASS INDEX: 34.11 KG/M2 | WEIGHT: 205 LBS

## 2024-03-12 DIAGNOSIS — Z98.51 TUBAL LIGATION STATUS: Chronic | ICD-10-CM

## 2024-03-12 DIAGNOSIS — I10 ESSENTIAL (PRIMARY) HYPERTENSION: ICD-10-CM

## 2024-03-12 DIAGNOSIS — Z98.890 OTHER SPECIFIED POSTPROCEDURAL STATES: Chronic | ICD-10-CM

## 2024-03-12 PROCEDURE — G0463: CPT

## 2024-03-12 PROCEDURE — 99443: CPT

## 2024-03-12 RX ORDER — TOPIRAMATE 25 MG/1
25 TABLET, FILM COATED ORAL
Qty: 49 | Refills: 0 | Status: DISCONTINUED | COMMUNITY
Start: 2024-03-12 | End: 2024-03-12

## 2024-03-12 NOTE — HISTORY OF PRESENT ILLNESS
[Home] : at home, [unfilled] , at the time of the visit. [Medical Office: ([facility])___] : at the medical office located in  [Verbal consent obtained from patient] : the patient, [unfilled] [FreeTextEntry1] : 60 y/o F with PMH s/p bariatric sleeve surgery (3/18/2019), DAVIS, asthma who presents for follow-up.  Current weight is 205 lbs (last weight was 203 lbs at 2/6/24 telephonic visit). Target weight is 170 lbs. Heaviest weight 290lbs. Pt was continued at maintenance dose of Phentermine 37.5 mg QAM before breakfast and Topiramate 100mg QD. Pt reports being sick. Pt reports she wants to get down a little more on weight to have a tummy tuck.  Diet:  - Breakfast 10-11AM): 1 hard-boiled egg on pumpernickel bread, avocado toast, farina w/ banana walnut, 1 cup of coffee w/ skim milk w/o sweetener - Lunch: Skipping lunch due to not being hungry - Dinner: grilled shrimp w/ salad no dressing or balsamic dressing w/ olive oil ( in [place]) - Snacks: low fat mozzarella cheese stick  - Beverages: hot lemon tea w/ 1/2 tsp honey and lots of water   Exercise: Cardio- dancing workout via video (5 days/week) 30-60 min per session  Sleep: 6 hrs/night. 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 Pt reports wanting to try something stronger. Pt reports wanting to try Ozempic but does not want to use an injection.

## 2024-03-12 NOTE — ASSESSMENT
[FreeTextEntry1] : 60 y/o F with PMH s/p bariatric sleeve surgery (3/18/2019), DAVIS, asthma who presents for follow-up.  #Morbid obesity - Pt has lost 25-27% total body weight compared to weight prior to gastric sleeve surgery - C/w Phentermine 37.5 mg QAM before breakfast and Topiramate 100mg - Add Topiramate 25mg during day and 100 QHS -> 25mg additional during day after 1 week. Educated pt to watch our for side effects and DC medication if she experiences side effects. - Consider adding metformin later on at subsequent visit - Reiterated to patient that diet should be whole foods and plant-based   Case discussed w/ Dr. Cobb - Pt to f/u in 5 weeks in clinic.

## 2024-03-19 DIAGNOSIS — E66.01 MORBID (SEVERE) OBESITY DUE TO EXCESS CALORIES: ICD-10-CM

## 2024-04-23 ENCOUNTER — OUTPATIENT (OUTPATIENT)
Dept: OUTPATIENT SERVICES | Facility: HOSPITAL | Age: 59
LOS: 1 days | End: 2024-04-23
Payer: COMMERCIAL

## 2024-04-23 ENCOUNTER — APPOINTMENT (OUTPATIENT)
Dept: INTERNAL MEDICINE | Facility: CLINIC | Age: 59
End: 2024-04-23
Payer: COMMERCIAL

## 2024-04-23 VITALS — WEIGHT: 207 LBS | BODY MASS INDEX: 34.45 KG/M2

## 2024-04-23 DIAGNOSIS — J45.909 UNSPECIFIED ASTHMA, UNCOMPLICATED: ICD-10-CM

## 2024-04-23 DIAGNOSIS — Z98.890 OTHER SPECIFIED POSTPROCEDURAL STATES: Chronic | ICD-10-CM

## 2024-04-23 DIAGNOSIS — I10 ESSENTIAL (PRIMARY) HYPERTENSION: ICD-10-CM

## 2024-04-23 DIAGNOSIS — Z98.51 TUBAL LIGATION STATUS: Chronic | ICD-10-CM

## 2024-04-23 PROCEDURE — G0463: CPT

## 2024-04-23 PROCEDURE — 99214 OFFICE O/P EST MOD 30 MIN: CPT | Mod: GC

## 2024-04-23 NOTE — PHYSICAL EXAM
[Normal] : affect appropriate [FreeTextEntry1] : Limited assessment as nature of visit was telephonic.

## 2024-04-23 NOTE — HISTORY OF PRESENT ILLNESS
[Home] : at home, [unfilled] , at the time of the visit. [Medical Office: ([facility])___] : at the medical office located in  [Verbal consent obtained from patient] : the patient, [unfilled] [FreeTextEntry1] : 58 y/o F with PMH s/p bariatric sleeve surgery (3/18/2019), DAVIS, asthma, OA who presents for follow-up.  Interval:  - 4/17, 4/22 cortisone shots Right knee. Feels like increased weight gain. Topiramate 50mg QAM + 100mg QHS.  - 6hr road trip to [place], had significant leg edema afterwards limiting her exercise, now improved. Unclear if evaluated for DVT, but denies pain, SOB, chest px.  - Current Weight: 207lb. lowest 202lb past week. (last weight 205lb during televisit.) Target weight remains 170lb. Heaviest weight 290lbs pre-sleeve. - On Phentermine 37.5mg QAM, Topiramate 50mg QAM, Topiramate 100mg QHS. Denies any adverse effects.  Diet:  - Breakfast (7AM): farina + skim milk + butter + cinnamon + banana + walnut ~half bowl, 1 cup of coffee w/ skim milk half pack of sugar.  - Lunch (12-1PM): Skipping lunch due to not being hungry. Sometimes just crackers, smart balance butter - Dinner (5-6PM): Chicken, pork chop (baked), fish w/ rice & beans. Sweet potato. Salad no dressing or balsamic dressing w/ olive oil ( in [place]) - Snacks (8PM): low fat mozzarella cheese stick, cookie, Lay's chips - Beverages: hot lemon tea w/ 1/2 tsp honey and lots of water   Exercise: Minimal recently due to knee pain. Cardio- dancing workout via video (5 days/week) 30-60 min per session  Sleep: 10PM-5AM. 6 hrs/night. 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 Pt reports wanting to try something stronger. Pt reports wanting to try Ozempic but does not want to use an injection. Interested in Rybelsus -- discussed cost of getting meds delivered from [place] ~$250-500/month, patient remains interested. Hold off on GLP-1 until dietary changes implemented.

## 2024-04-23 NOTE — END OF VISIT
[Time Spent: ___ minutes] : I have spent [unfilled] minutes of time on the encounter. [] : A student assisted with documenting this visit. I have reviewed and verified all information documented by the student, and made modifications to such information, when appropriate.

## 2024-04-23 NOTE — ASSESSMENT
[FreeTextEntry1] : 59F with PMH s/p bariatric sleeve surgery (3/18/2019), DAVIS, asthma, OA who presents for follow-up.  Assessment: BARIATRIC SURGERY: Bariatric Sleeve Gastrectomy (3/18/2019) OBESITY COMORBIDITIES: DAVIS, asthma, OA ANTI-OBESITY MEDICATIONS: Phentermine 37.5 mg QAM before breakfast, Topiramate 50mg QAM, 100mg QHS, ---> start metformin 500mg QD, increase to BID AOM SIDE EFFECTS: Denies  #Morbid obesity - Pt has lost 25-27% total body weight compared to weight prior to gastric sleeve surgery - restart exercise regimen, increase cardio as tolerated, can track heart rate with smart watch. - discussed in detail removing butter from diet (even "healthy butter"), sugar in coffee.  - advised pt to frontload breakfast with high-satiety foods such as legumes, whole potato, even rice - Reiterated to patient that diet should be whole foods and plant-based - C/w Phentermine 37.5 mg QAM before breakfast and Topiramate 50mg QAM 100mg QHS - start metformin 500mg QD, increase to BID ~1 week if no adverse effects. - Pt very interested in Rybelsus, open to paying OOP if effective. Discussed that we can definitely consider but the main issue remains her diet  Return to clinic in 1 month for OM F/U in person or PRN. Case discussed w/ Dr. Reza Kaufman MD PGY-2 Internal Medicine [facility]

## 2024-05-21 ENCOUNTER — APPOINTMENT (OUTPATIENT)
Dept: INTERNAL MEDICINE | Facility: CLINIC | Age: 59
End: 2024-05-21
Payer: COMMERCIAL

## 2024-05-21 VITALS — BODY MASS INDEX: 34.11 KG/M2 | WEIGHT: 205 LBS

## 2024-05-21 DIAGNOSIS — G47.33 OBSTRUCTIVE SLEEP APNEA (ADULT) (PEDIATRIC): ICD-10-CM

## 2024-05-21 PROCEDURE — 99214 OFFICE O/P EST MOD 30 MIN: CPT | Mod: GC

## 2024-05-21 NOTE — REASON FOR VISIT
[Home] : at home, [unfilled] , at the time of the visit. [Medical Office: ([facility])___] : at the medical office located in  [Patient] : the patient [Follow-Up] : a follow-up visit [FreeTextEntry1] : obesity

## 2024-05-21 NOTE — HISTORY OF PRESENT ILLNESS
[FreeTextEntry1] : Current weight is 205 lbs (last weight was 207 lbs at 4/23/24 telephonic visit). Target weight is 170 lbs. Heaviest weight 290lbs. Pt was continued at maintenance dose of Phentermine 37.5 mg QAM before breakfast and Topiramate 100mg QD and Metformin 500mg BID. Pt. reports experiencing less hunger and an episode of diarrhea on 5/20/24.  Diet: - Breakfast 9-10AM: 1egg white. 2 hash brown potatoes, toast with a small amount of butter, large cup of coffee with milk and sugar. - Lunch: Skipping lunch due to not being hungry - Dinner: Baked/air fried/grilled shrimp, chicken, steak, tilapia or salmon with salad.  - Snacks: not snacking due to busy schedule. - Beverages: coffee in the morning, water, alcohol only socially. Last alcoholic drink was 3mo. ago.   Exercise: Cardio- dancing workout via video (3 days/week) 90 min per session. Weights (3 days/week) 30 minutes per session.  Sleep: 6-7 hrs/night. 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   Medications and tolerance: Phentermine, Topiramate, and Metformin w/o side effects. Pt reports feeling as though the medications are not helping with her weight.

## 2024-05-21 NOTE — ASSESSMENT
[FreeTextEntry1] : Plan: -Monitor for further GI upset. -Continue Phentermine, Topiramate, and Metformin. -Diet: Eat more plant-based foods over animal-based products, try and eat 3 full meals per day. -Request in-person follow-up for labs and physical exam.   F/u in 5 weeks in person

## 2024-06-05 ENCOUNTER — APPOINTMENT (OUTPATIENT)
Dept: SURGERY | Facility: CLINIC | Age: 59
End: 2024-06-05

## 2024-06-05 ENCOUNTER — APPOINTMENT (OUTPATIENT)
Dept: SURGERY | Facility: CLINIC | Age: 59
End: 2024-06-05
Payer: COMMERCIAL

## 2024-06-05 VITALS
TEMPERATURE: 97.7 F | BODY MASS INDEX: 37.21 KG/M2 | HEART RATE: 87 BPM | DIASTOLIC BLOOD PRESSURE: 78 MMHG | HEIGHT: 62.5 IN | WEIGHT: 207.4 LBS | OXYGEN SATURATION: 97 % | RESPIRATION RATE: 16 BRPM | SYSTOLIC BLOOD PRESSURE: 128 MMHG

## 2024-06-05 DIAGNOSIS — Z00.00 ENCOUNTER FOR GENERAL ADULT MEDICAL EXAMINATION W/OUT ABNORMAL FINDINGS: ICD-10-CM

## 2024-06-05 PROCEDURE — 99213 OFFICE O/P EST LOW 20 MIN: CPT

## 2024-06-05 NOTE — PLAN
[FreeTextEntry1] : Referral in place for consultation with Dr. Greenwood.  Patient's questions and concerns addressed to the patient's satisfaction. She will return to see me in 6 months.

## 2024-06-05 NOTE — REVIEW OF SYSTEMS
[Fever] : no fever [Chills] : no chills [Fatigue] : no fatigue [Abdominal Pain] : no abdominal pain [Reflux/Heartburn] : no reflux/ heartburn [FreeTextEntry9] : Right knee chronic arthritis

## 2024-06-05 NOTE — PHYSICAL EXAM
[de-identified] : Via telephone.  Patient not in any distress.   [de-identified] : Breathing non-labored, not tachypneic [de-identified] : Soft, nontender, nondistended. No masses, rebound or guarding.  No hernias palpable. [de-identified] : abdominal sagging pannus

## 2024-06-05 NOTE — ASSESSMENT
[FreeTextEntry1] : Patient with history of severe obesity s/p sleeve gastrectomy.     Patient has had some weight regain, but has previously had success with medical weight management, and we agreed that she would benefit from re-engaging with this.

## 2024-06-05 NOTE — HISTORY OF PRESENT ILLNESS
[de-identified] : Patient is s/p Laparoscopic sleeve gastrectomy on 03/18/2019.   Patient feels well overall and has no complaints at this time.  She is however, discouraged by some recent weight gain.  She states that she does feel full rapidly, and if she eats too fast or too much she does have some emesis, but this is uncommon.  She continues to exercise every day.  She has been working with medical weight management and has had some success with phentermine, but was not able to tolerate metformin.  She would like to work with our program's medical weight management team.   Compliant with supplements.  Patient is engaging in regular exercise.   Patient's medications, allergies, past medical, surgical, social and family histories were reviewed and updated as appropriate.     [de-identified] : Her right-sided abdominal discomfort is resolved.

## 2024-06-13 ENCOUNTER — APPOINTMENT (OUTPATIENT)
Dept: FAMILY MEDICINE | Facility: CLINIC | Age: 59
End: 2024-06-13
Payer: COMMERCIAL

## 2024-06-13 DIAGNOSIS — Z56.6 OTHER PHYSICAL AND MENTAL STRAIN RELATED TO WORK: ICD-10-CM

## 2024-06-13 DIAGNOSIS — E66.01 MORBID (SEVERE) OBESITY DUE TO EXCESS CALORIES: ICD-10-CM

## 2024-06-13 PROCEDURE — 99205 OFFICE O/P NEW HI 60 MIN: CPT

## 2024-06-13 RX ORDER — BUPROPION HYDROCHLORIDE 150 MG/1
150 TABLET, EXTENDED RELEASE ORAL
Qty: 30 | Refills: 0 | Status: ACTIVE | COMMUNITY
Start: 2024-06-13 | End: 1900-01-01

## 2024-06-13 RX ORDER — NALTREXONE HYDROCHLORIDE 50 MG/1
50 TABLET, FILM COATED ORAL DAILY
Qty: 30 | Refills: 2 | Status: ACTIVE | COMMUNITY
Start: 2024-06-13 | End: 1900-01-01

## 2024-06-13 RX ORDER — PHENTERMINE HYDROCHLORIDE 37.5 MG/1
37.5 CAPSULE ORAL
Qty: 30 | Refills: 0 | Status: DISCONTINUED | COMMUNITY
Start: 2023-01-19 | End: 2024-06-13

## 2024-06-13 RX ORDER — METFORMIN HYDROCHLORIDE 500 MG/1
500 TABLET, COATED ORAL
Qty: 180 | Refills: 1 | Status: DISCONTINUED | COMMUNITY
Start: 2024-04-23 | End: 2024-06-13

## 2024-06-13 RX ORDER — TOPIRAMATE 25 MG/1
25 TABLET, FILM COATED ORAL
Qty: 360 | Refills: 1 | Status: DISCONTINUED | COMMUNITY
Start: 2022-10-11 | End: 2024-06-13

## 2024-06-13 RX ORDER — TOPIRAMATE 50 MG/1
50 TABLET, FILM COATED ORAL EVERY MORNING
Qty: 60 | Refills: 1 | Status: DISCONTINUED | COMMUNITY
Start: 2024-03-12 | End: 2024-06-13

## 2024-06-13 SDOH — HEALTH STABILITY - MENTAL HEALTH: OTHER PHYSICAL AND MENTAL STRAIN RELATED TO WORK: Z56.6

## 2024-06-13 NOTE — HISTORY OF PRESENT ILLNESS
[Home] : at home, [unfilled] , at the time of the visit. [Medical Office: ([facility])___] : at the medical office located in  [Verbal consent obtained from patient] : the patient, [unfilled] [FreeTextEntry1] : Pt presenting for medical weight management appt   Current weight is 205 lbs (last weight was 207 lbs at 4/23/24 telephonic visit). Target weight is 170 lbs. Heaviest weight 290lbs  Diet: - Breakfast 9-10AM: 1egg white. 2 hash brown potatoes, toast with a small amount of butter, large cup of coffee with milk and sugar. - Lunch: Skipping lunch due to not being hungry - Dinner: Baked/air fried/grilled shrimp, chicken, steak, tilapia or salmon with salad.- largest meal of the day.  - Snacks: not snacking due to busy schedule. - Beverages: coffee in the morning, water, alcohol only socially. Last alcoholic drink was 3mo. ago.  Exercise: Cardio- dancing workout via video (3 days/week) 90 min per session. Weights (3 days/week) 30 minutes per session.  Sleep: 6-7 hrs/night. 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  Medications and tolerance: Phentermine, Topiramate, and Metformin w/o side effects. Pt reports feeling as though the medications are not helping with her weight.  Would like to switch to another medication  did lost lost of inches s/p bariatric surgery

## 2024-06-13 NOTE — ASSESSMENT
April 20, 2023      [facility]  [address], [address]  [facility] [address]  Phone: [phone]  Fax: [phone]       Patient: Padmini Johnson   YOB: 2020  Date of Visit: 04/20/2023    To Whom It May Concern:    Nathanael Johnson  was at [facility] on 04/20/2023. The patient may return to work/school on 04/21/2023 with no restrictions. If you have any questions or concerns, or if I can be of further assistance, please do not hesitate to contact me.    Sincerely,    Ginger Kiran LPN      [FreeTextEntry1] : Stop phentermine + topamax, has been on phentermine >1 year advised AE with long term use will wean off phentermine  wean off topmax  start contrave components- sent to pharmacy if needed will send brand contrave over   cont physical activity and dietary changes  advised to increase protein in every meal  goal for next visit: trial contrave + have lunch 3-4x weekly  f/u 4-6 weeks

## 2024-06-25 ENCOUNTER — APPOINTMENT (OUTPATIENT)
Dept: INTERNAL MEDICINE | Facility: CLINIC | Age: 59
End: 2024-06-25

## 2024-07-18 ENCOUNTER — APPOINTMENT (OUTPATIENT)
Dept: FAMILY MEDICINE | Facility: CLINIC | Age: 59
End: 2024-07-18
Payer: COMMERCIAL

## 2024-07-18 VITALS — HEIGHT: 62.5 IN | BODY MASS INDEX: 36.78 KG/M2 | WEIGHT: 205 LBS

## 2024-07-18 DIAGNOSIS — E66.01 MORBID (SEVERE) OBESITY DUE TO EXCESS CALORIES: ICD-10-CM

## 2024-07-18 DIAGNOSIS — G47.33 OBSTRUCTIVE SLEEP APNEA (ADULT) (PEDIATRIC): ICD-10-CM

## 2024-07-18 PROCEDURE — 99214 OFFICE O/P EST MOD 30 MIN: CPT

## 2024-08-15 ENCOUNTER — APPOINTMENT (OUTPATIENT)
Dept: FAMILY MEDICINE | Facility: CLINIC | Age: 59
End: 2024-08-15
Payer: COMMERCIAL

## 2024-08-15 VITALS — BODY MASS INDEX: 36.78 KG/M2 | HEIGHT: 62.5 IN | WEIGHT: 205 LBS

## 2024-08-15 DIAGNOSIS — E66.01 MORBID (SEVERE) OBESITY DUE TO EXCESS CALORIES: ICD-10-CM

## 2024-08-15 PROCEDURE — 99214 OFFICE O/P EST MOD 30 MIN: CPT

## 2024-08-15 NOTE — HISTORY OF PRESENT ILLNESS
[Home] : at home, [unfilled] , at the time of the visit. [Medical Office: ([facility])___] : at the medical office located in  [Verbal consent obtained from patient] : the patient, [unfilled] [FreeTextEntry1] : Pt presenting for f/u of medical weight management.    Diet: trying to eat healthy, has decreased appetite with wegovy 1.0 mg eating less meals   Exercise: Cardio-  Sleep: 6-7 hrs/night. Reports not waking up in the middle of the night or waking up tired in AM  Stress: Job  Medications and tolerance:  Pt reports feeling as though the medications are helping with her weight. Currently on Wegovy 1.0 mg weekly

## 2024-08-15 NOTE — ASSESSMENT
[FreeTextEntry1] :    Emphasized the need to reduce calories for what her current patterns are and to hopefully increase physical activity as well. We discussed menu selection as well as food preparation techniques.  Educated patient on 150 minutes of moderate intensity exercise weekly with strength training twice weekly. Encouraged patient to monitor physical activity  Discussed the importance of a balanced, healthy diet of nourishing foods and consistent meal pattern for long-term success and health maintenance. Encouraged to increase water intake to facilitate adequate hydration and to cut out sugary drinks and alcohol. I encouraged the patient to keep food records in order to better track calorie consumed.   Plan:  To continue with nutrition counseling Medication: Cont wegovy  increase dose to 1.7mg weekly discussed diet and exercise changes discussed high protein diet and hydration

## 2024-09-18 ENCOUNTER — APPOINTMENT (OUTPATIENT)
Dept: FAMILY MEDICINE | Facility: CLINIC | Age: 59
End: 2024-09-18
Payer: COMMERCIAL

## 2024-09-18 VITALS — HEIGHT: 62.5 IN | WEIGHT: 208 LBS | BODY MASS INDEX: 37.32 KG/M2

## 2024-09-18 DIAGNOSIS — E66.01 MORBID (SEVERE) OBESITY DUE TO EXCESS CALORIES: ICD-10-CM

## 2024-09-18 PROCEDURE — 99214 OFFICE O/P EST MOD 30 MIN: CPT

## 2024-09-18 NOTE — HISTORY OF PRESENT ILLNESS
[Home] : at home, [unfilled] , at the time of the visit. [Medical Office: ([facility])___] : at the medical office located in  [Verbal consent obtained from patient] : the patient, [unfilled] [FreeTextEntry1] : Pt presenting for f/u of medical weight management.    Diet: trying to eat healthy, has decreased appetite with wegovy 1.0 mg eating less meals   Exercise: Cardio-  Sleep: 6-7 hrs/night. Reports not waking up in the middle of the night or waking up tired in AM  Stress: Job  Medications and tolerance:  Pt reports feeling as though the medications are helping with her weight. Currently on Wegovy 1.7 mg weekly- feels more muscle mass

## 2024-09-18 NOTE — ASSESSMENT
[FreeTextEntry1] :    Emphasized the need to reduce calories for what her current patterns are and to hopefully increase physical activity as well. We discussed menu selection as well as food preparation techniques.  Educated patient on 150 minutes of moderate intensity exercise weekly with strength training twice weekly. Encouraged patient to monitor physical activity  Discussed the importance of a balanced, healthy diet of nourishing foods and consistent meal pattern for long-term success and health maintenance. Encouraged to increase water intake to facilitate adequate hydration and to cut out sugary drinks and alcohol. I encouraged the patient to keep food records in order to better track calorie consumed.   Plan:  To continue with nutrition counseling Medication: Cont wegovy  cont dose 1.7mg weekly x3 months discussed diet and exercise changes discussed high protein diet and hydration  will be moving to [place] in near future

## 2024-10-10 ENCOUNTER — NON-APPOINTMENT (OUTPATIENT)
Age: 59
End: 2024-10-10

## 2024-10-23 ENCOUNTER — NON-APPOINTMENT (OUTPATIENT)
Age: 59
End: 2024-10-23

## 2024-11-19 ENCOUNTER — APPOINTMENT (OUTPATIENT)
Dept: FAMILY MEDICINE | Facility: CLINIC | Age: 59
End: 2024-11-19
Payer: COMMERCIAL

## 2024-11-19 VITALS — HEIGHT: 62.5 IN | BODY MASS INDEX: 35.88 KG/M2 | WEIGHT: 200 LBS

## 2024-11-19 DIAGNOSIS — E66.01 MORBID (SEVERE) OBESITY DUE TO EXCESS CALORIES: ICD-10-CM

## 2024-11-19 PROCEDURE — 99214 OFFICE O/P EST MOD 30 MIN: CPT
